# Patient Record
Sex: FEMALE | Race: ASIAN | NOT HISPANIC OR LATINO | Employment: FULL TIME | URBAN - METROPOLITAN AREA
[De-identification: names, ages, dates, MRNs, and addresses within clinical notes are randomized per-mention and may not be internally consistent; named-entity substitution may affect disease eponyms.]

---

## 2020-01-17 ENCOUNTER — TRANSFERRED RECORDS (OUTPATIENT)
Dept: HEALTH INFORMATION MANAGEMENT | Facility: CLINIC | Age: 25
End: 2020-01-17

## 2021-10-28 ENCOUNTER — TRANSFERRED RECORDS (OUTPATIENT)
Dept: HEALTH INFORMATION MANAGEMENT | Facility: CLINIC | Age: 26
End: 2021-10-28

## 2024-03-10 ENCOUNTER — HEALTH MAINTENANCE LETTER (OUTPATIENT)
Age: 29
End: 2024-03-10

## 2024-03-18 ENCOUNTER — OFFICE VISIT (OUTPATIENT)
Dept: OBGYN | Facility: CLINIC | Age: 29
End: 2024-03-18
Payer: COMMERCIAL

## 2024-03-18 VITALS
DIASTOLIC BLOOD PRESSURE: 70 MMHG | SYSTOLIC BLOOD PRESSURE: 118 MMHG | BODY MASS INDEX: 40.78 KG/M2 | WEIGHT: 216 LBS | HEIGHT: 61 IN

## 2024-03-18 DIAGNOSIS — N91.5 OLIGOMENORRHEA, UNSPECIFIED TYPE: ICD-10-CM

## 2024-03-18 DIAGNOSIS — N97.9 FEMALE INFERTILITY: ICD-10-CM

## 2024-03-18 DIAGNOSIS — Z12.4 SCREENING FOR CERVICAL CANCER: Primary | ICD-10-CM

## 2024-03-18 PROCEDURE — G0145 SCR C/V CYTO,THINLAYER,RESCR: HCPCS | Performed by: OBSTETRICS & GYNECOLOGY

## 2024-03-18 PROCEDURE — 99385 PREV VISIT NEW AGE 18-39: CPT | Performed by: OBSTETRICS & GYNECOLOGY

## 2024-03-18 PROCEDURE — 99213 OFFICE O/P EST LOW 20 MIN: CPT | Mod: 25 | Performed by: OBSTETRICS & GYNECOLOGY

## 2024-03-18 RX ORDER — MEDROXYPROGESTERONE ACETATE 10 MG
10 TABLET ORAL DAILY
Qty: 10 TABLET | Refills: 3 | Status: SHIPPED | OUTPATIENT
Start: 2024-03-18

## 2024-03-18 ASSESSMENT — PATIENT HEALTH QUESTIONNAIRE - PHQ9
SUM OF ALL RESPONSES TO PHQ QUESTIONS 1-9: 17
SUM OF ALL RESPONSES TO PHQ QUESTIONS 1-9: 17
10. IF YOU CHECKED OFF ANY PROBLEMS, HOW DIFFICULT HAVE THESE PROBLEMS MADE IT FOR YOU TO DO YOUR WORK, TAKE CARE OF THINGS AT HOME, OR GET ALONG WITH OTHER PEOPLE: SOMEWHAT DIFFICULT

## 2024-03-18 NOTE — PROGRESS NOTES
"  SUBJECTIVE:                                                   CC:  Patient presents with:  Gyn Exam: Irregular periods and trying to conceive      HPI:  Alfonso Riddle is a 29 year old  who presents to discuss infertility.    They have been trying to conceive for 2 years.  She only has very light bleeding 2 or 3 times per year, very irregular periods.  Previously she has had a workup for PCOS in the different system and was diagnosed with this.  She notes that she was given Provera at 1.4 withdrawal bleed, but after 10 days of the medication, she never achieved the withdrawal bleed.      She does have a mustache that she shaves on a daily basis.  She also has a diagnosis of prediabetes and takes metformin.  She is also been doing some reading about Cushing syndrome and wonders, since she does feel that she has a little hump on the back of her neck, if this diagnosis could apply.  Denies acanthosis, spontaneous nipple discharge.  Denies history of endometriosis, STI, PID, ectopic pregnancy, or other surgery on her fallopian tubes.    FOB is Antonio Lugo  1989.  He has not fathered any children.  He has never done a semen analysis.    Gyn History:  Patient's last menstrual period was 11/15/2023 (approximate).        Last 3 Pap and HPV Results:  done today    PMH, PSH, Soc Hx, Fam Hx, Meds, and allergies reviewed in Epic.        3/18/2024     9:01 AM   PHQ   PHQ-9 Total Score 17   Q9: Thoughts of better off dead/self-harm past 2 weeks Not at all        OBJECTIVE:     /70 (BP Location: Right arm, Patient Position: Chair, Cuff Size: Adult Large)   Ht 1.549 m (5' 1\")   Wt 98 kg (216 lb)   LMP 11/15/2023 (Approximate)   Breastfeeding No   BMI 40.81 kg/m      Gen: Healthy appearing obese female, no acute distress, comfortable  No obvious hirsutism or acne  Psych: mentation appears normal and affect bright  : Normal external female genitalia.  No external lesions.   SSE: Speculum exam reveals " vaginal epithelium well rugated with normal physiologic discharge. Cervix appears smooth, pink, with no visible lesions.     ASSESSMENT/PLAN:                                                      1. Screening for cervical cancer  - Pap imaged thin layer screen reflex to HPV if ASCUS - recommend age 25 - 29    2. Female infertility  - Mullerian Hormone Antibody; Future  - DHEA sulfate; Future  - Estradiol; Future  - Follicle stimulating hormone; Future  - Progesterone; Standing  - Prolactin; Future  - TSH with free T4 reflex; Future  - US Pelvic Transabdominal and Transvaginal; Future  - Testosterone Free and Total; Future  - Hemoglobin A1c; Future    3. Oligomenorrhea, unspecified type  Offered lab testing today, she would rather do the provera withdrawal bleed first and then do labs at a later date.  Also made recommendation for semen analysis for her partner.  Can make referral for endocrinology for Cushing work up if any labs found to be abnormal.   - Mullerian Hormone Antibody; Future  - DHEA sulfate; Future  - Estradiol; Future  - Follicle stimulating hormone; Future  - Progesterone; Standing  - Prolactin; Future  - TSH with free T4 reflex; Future  - US Pelvic Transabdominal and Transvaginal; Future  - Testosterone Free and Total; Future  - Hemoglobin A1c; Future  - medroxyPROGESTERone (PROVERA) 10 MG tablet; Take 1 tablet (10 mg) by mouth daily To bring on a period  Dispense: 10 tablet; Refill: 3    Greer River MD, MPH  Obstetrics and Gynecology

## 2024-03-18 NOTE — PROGRESS NOTES
"Alfonso is a 29 year old  female who presents for annual exam.     Menses are irregular and irregular lasting 3 days.  Menses flow: light.  Patient's last menstrual period was 11/15/2023 (approximate).. Using none for contraception.  She is currently considering pregnancy, trying for 2 years  Besides routine health maintenance,  she would like to discuss Infertility  GYNECOLOGIC HISTORY:    Alfonso is sexually active with 1 male partner(s) and is currently in monogamous relationship with .    History sexually transmitted infections:No STD history  STI testing offered?  Declined  History of abnormal Pap smear: NO - age 30- 65 PAP every 3 years recommended  Family history of breast CA: No  Family history of uterine/ovarian CA: No      HEALTH MAINTENANCE:  .  TSH: (No results found for: \"TSH\" )  Pap;Has not had one in years will do today.(Moved from [place])  PHQ2: 0      3/18/2024     9:01 AM   PHQ-2 (  [employer])   Q1: Little interest or pleasure in doing things 2   Q2: Feeling down, depressed or hopeless 3   PHQ-2 Score 5   Q1: Little interest or pleasure in doing things More than half the days   Q2: Feeling down, depressed or hopeless Nearly every day   PHQ-2 Score 5         HISTORY:  OB History    Para Term  AB Living   0 0 0 0 0 0   SAB IAB Ectopic Multiple Live Births   0 0 0 0 0     Past Medical History:   Diagnosis Date    PCOS (polycystic ovarian syndrome)      Past Surgical History:   Procedure Laterality Date    wissemyour       Family History   Problem Relation Age of Onset    Diabetes Maternal Grandmother     Diabetes Maternal Grandfather     Diabetes Paternal Aunt      Social History     Socioeconomic History    Marital status:      Spouse name: None    Number of children: None    Years of education: None    Highest education level: None   Tobacco Use    Smoking status: Never    Smokeless tobacco: Never   Substance and Sexual Activity    Alcohol use: Yes     Comment: social    " "Drug use: Never    Sexual activity: Yes     Partners: Male     No current outpatient medications on file.   No Known Allergies    Past medical, surgical, social and family history were reviewed and updated in EPIC.    EXAM:  /70 (BP Location: Right arm, Patient Position: Chair, Cuff Size: Adult Large)   Ht 1.549 m (5' 1\")   Wt 98 kg (216 lb)   LMP 11/15/2023 (Approximate)   Breastfeeding No   BMI 40.81 kg/m     BMI: Body mass index is 40.81 kg/m .  Constitutional: healthy, alert and no distress  Gastrointestinal: Abdomen soft, non-tender, non-distended. No masses, organomegaly.  :  Vulva:  No external lesions, normal female hair distribution, no inguinal adenopathy.    Urethra:  Midline, non-tender, well supported, no discharge  Vagina:  Moist, pink, no abnormal discharge, no lesions  Uterus:  Normal size , non-tender, freely mobile  Ovaries:  No masses appreciated, non-tender, mobile  Rectal Exam: deferred  Musculoskeletal: extremities normal  Skin: no suspicious lesions or rashes  Psychiatric: Affect appropriate, cooperative,mentation appears normal.     COUNSELING:   Reviewed preventive health counseling, as reflected in patient instructions  Special attention given to:        Family planning   reports that she has never smoked. She has never used smokeless tobacco.    Body mass index is 40.81 kg/m .  Weight management plan: Patient was referred to their PCP to discuss a diet and exercise plan.  FRAX Risk Assessment    ASSESSMENT:  29 year old female with satisfactory annual exam    1. Screening for cervical cancer  - Pap imaged thin layer screen reflex to HPV if ASCUS - recommend age 25 - 29  - HPV Hold (Lab Only)    2. Female infertility  - Mullerian Hormone Antibody; Future  - DHEA sulfate; Future  - Estradiol; Future  - Follicle stimulating hormone; Future  - Progesterone; Standing  - Prolactin; Future  - TSH with free T4 reflex; Future  - US Pelvic Transabdominal and Transvaginal; Future  - " Testosterone Free and Total; Future  - Hemoglobin A1c; Future  - Adult Mental Health  Referral; Future    3. Oligomenorrhea, unspecified type  - Mullerian Hormone Antibody; Future  - DHEA sulfate; Future  - Estradiol; Future  - Follicle stimulating hormone; Future  - Progesterone; Standing  - Prolactin; Future  - TSH with free T4 reflex; Future  - US Pelvic Transabdominal and Transvaginal; Future  - Testosterone Free and Total; Future  - Hemoglobin A1c; Future  - medroxyPROGESTERone (PROVERA) 10 MG tablet; Take 1 tablet (10 mg) by mouth daily To bring on a period  Dispense: 10 tablet; Refill: 3     Greer River MD

## 2024-03-18 NOTE — NURSING NOTE
"Chief Complaint   Patient presents with    Gyn Exam     Irregular periods and trying to conceive       Initial /70 (BP Location: Right arm, Patient Position: Chair, Cuff Size: Adult Large)   Ht 1.549 m (5' 1\")   Wt 98 kg (216 lb)   LMP 11/15/2023 (Approximate)   Breastfeeding No   BMI 40.81 kg/m   Estimated body mass index is 40.81 kg/m  as calculated from the following:    Height as of this encounter: 1.549 m (5' 1\").    Weight as of this encounter: 98 kg (216 lb).  BP completed using cuff size: large    Questioned patient about current smoking habits.  Pt. has never smoked.          The following HM Due: pap smear    Sharri Jc CMA      "

## 2024-03-21 LAB
BKR LAB AP GYN ADEQUACY: NORMAL
BKR LAB AP GYN INTERPRETATION: NORMAL
BKR LAB AP HPV REFLEX: NORMAL
BKR LAB AP LMP: NORMAL
BKR LAB AP PREVIOUS ABNORMAL: NORMAL
PATH REPORT.COMMENTS IMP SPEC: NORMAL
PATH REPORT.COMMENTS IMP SPEC: NORMAL
PATH REPORT.RELEVANT HX SPEC: NORMAL

## 2024-03-28 ENCOUNTER — TELEPHONE (OUTPATIENT)
Dept: OBGYN | Facility: CLINIC | Age: 29
End: 2024-03-28
Payer: COMMERCIAL

## 2024-03-28 NOTE — TELEPHONE ENCOUNTER
[facility] Call [facility]    Phone Message    May a detailed message be left on voicemail: yes     Reason for Call: Other: Pt states her new ins isnt active till next week so she is needing to reschedule her US and upcoming appt with Aleta. Pt is scheduled for a telephone call, Aleta next available isnt till May. Pt requesting an appt before then if possible. Please advise      Action Taken: Message routed to:  Other: [facility]    Travel Screening: Not Applicable

## 2024-03-28 NOTE — TELEPHONE ENCOUNTER
Returned call to patient, scheduled at [facility], evening of 4/1/24.    Florina PALOMARES RN

## 2024-04-01 ENCOUNTER — HOSPITAL ENCOUNTER (OUTPATIENT)
Dept: ULTRASOUND IMAGING | Facility: CLINIC | Age: 29
Discharge: HOME | End: 2024-04-01
Attending: OBSTETRICS & GYNECOLOGY | Admitting: OBSTETRICS & GYNECOLOGY
Payer: COMMERCIAL

## 2024-04-01 DIAGNOSIS — N97.9 FEMALE INFERTILITY: ICD-10-CM

## 2024-04-01 DIAGNOSIS — N91.5 OLIGOMENORRHEA, UNSPECIFIED TYPE: ICD-10-CM

## 2024-04-01 PROCEDURE — 76830 TRANSVAGINAL US NON-OB: CPT

## 2024-04-02 ENCOUNTER — VIRTUAL VISIT (OUTPATIENT)
Dept: OBGYN | Facility: CLINIC | Age: 29
End: 2024-04-02
Payer: COMMERCIAL

## 2024-04-02 DIAGNOSIS — N97.9 FEMALE INFERTILITY: Primary | ICD-10-CM

## 2024-04-02 DIAGNOSIS — N91.5 OLIGOMENORRHEA, UNSPECIFIED TYPE: ICD-10-CM

## 2024-04-02 PROCEDURE — 99213 OFFICE O/P EST LOW 20 MIN: CPT | Mod: 93 | Performed by: OBSTETRICS & GYNECOLOGY

## 2024-04-02 NOTE — PROGRESS NOTES
Alfonso Riddle is a 29 year old female who is being evaluated via a billable telephone visit.      What phone number would you like to be contacted at? [phone]  How would you like to obtain your AVS? Vladkristin      Alfonso Riddle is a 29 year old female who presents for virtual visit today for the following health issue(s):  Patient presents with:  Follow Up: On pelvic ultrasound that patient had yesterday.       Patient will be at her home in [facility] for the telephone visit.   Provider will be in clinic at [facility].      Additional information:     Got her period yesterday.  Pelvic US yesterday showed a thin EMS without specific signs of PCOS.  She has needle phobia and wonders if she needs to get the labs now (already ordered from prior visit).      EXAM: US PELVIC TRANSABDOMINAL AND TRANSVAGINAL  LOCATION: [facility]  DATE: 4/1/2024     INDICATION: Infertility. Oligomenorrhea.  COMPARISON: None.  TECHNIQUE: Transabdominal scans were performed. Endovaginal ultrasound was performed to better visualize the adnexa.     FINDINGS:     UTERUS: 6 x 3.2 x 2.8 cm. Normal in size and position with no masses.     ENDOMETRIUM: 2 mm. Normal smooth endometrium.     RIGHT OVARY: 2 x 1.8 x 1.1 cm. Normal. A few small peripheral follicles are present in the right ovary.     LEFT OVARY: 2.1 x 1.8 x 1.5 cm. Normal. Several small peripheral follicles are noted in the left ovary.     No significant free fluid.                                                                      IMPRESSION:  Unremarkable pelvic ultrasound.    Reviewed images personally, there are visible follicles on the periphery however the ovaries are not increased in volume nor are the ovaries specifically concerning morphologically for PCOS.  Also the endometrial stripe is thin.    Recommend going forth with the labs now (day 3, since she is now having a period), and then meeting in 4-6 wks after labs and SA have been  performed.  She may possibly need an appt w endocrinology or RAHEL for infertility.      All questions answered today.    1. Female infertility    2. Oligomenorrhea, unspecified type    Greer River MD, MPH  [facility]

## 2024-04-02 NOTE — RESULT ENCOUNTER NOTE
Results discussed directly with patient while patient was present during in person visit. Any further details documented in the note.   Greer River MD

## 2024-04-03 ENCOUNTER — LAB (OUTPATIENT)
Dept: LAB | Facility: CLINIC | Age: 29
End: 2024-04-03
Payer: COMMERCIAL

## 2024-04-03 DIAGNOSIS — N97.9 FEMALE INFERTILITY: ICD-10-CM

## 2024-04-03 DIAGNOSIS — N91.5 OLIGOMENORRHEA, UNSPECIFIED TYPE: ICD-10-CM

## 2024-04-03 LAB
ESTRADIOL SERPL-MCNC: 21 PG/ML
FSH SERPL IRP2-ACNC: 11.5 MIU/ML
HBA1C MFR BLD: 5.9 % (ref 0–5.6)
MIS SERPL-MCNC: 1.72 NG/ML (ref 0.89–9.9)
PROGEST SERPL-MCNC: 0.3 NG/ML
PROLACTIN SERPL 3RD IS-MCNC: 23 NG/ML (ref 5–23)
SHBG SERPL-SCNC: 17 NMOL/L (ref 30–135)
TSH SERPL DL<=0.005 MIU/L-ACNC: 3.64 UIU/ML (ref 0.3–4.2)

## 2024-04-03 PROCEDURE — 84146 ASSAY OF PROLACTIN: CPT

## 2024-04-03 PROCEDURE — 84403 ASSAY OF TOTAL TESTOSTERONE: CPT

## 2024-04-03 PROCEDURE — 83036 HEMOGLOBIN GLYCOSYLATED A1C: CPT

## 2024-04-03 PROCEDURE — 84144 ASSAY OF PROGESTERONE: CPT

## 2024-04-03 PROCEDURE — 36415 COLL VENOUS BLD VENIPUNCTURE: CPT

## 2024-04-03 PROCEDURE — 83001 ASSAY OF GONADOTROPIN (FSH): CPT

## 2024-04-03 PROCEDURE — 82670 ASSAY OF TOTAL ESTRADIOL: CPT

## 2024-04-03 PROCEDURE — 82627 DEHYDROEPIANDROSTERONE: CPT

## 2024-04-03 PROCEDURE — 82166 ASSAY ANTI-MULLERIAN HORM: CPT

## 2024-04-03 PROCEDURE — 84270 ASSAY OF SEX HORMONE GLOBUL: CPT

## 2024-04-03 PROCEDURE — 84443 ASSAY THYROID STIM HORMONE: CPT

## 2024-04-04 LAB — DHEA-S SERPL-MCNC: 225 UG/DL (ref 35–430)

## 2024-04-05 ENCOUNTER — VIRTUAL VISIT (OUTPATIENT)
Dept: OBGYN | Facility: CLINIC | Age: 29
End: 2024-04-05
Payer: COMMERCIAL

## 2024-04-05 DIAGNOSIS — R73.03 PRE-DIABETES: ICD-10-CM

## 2024-04-05 DIAGNOSIS — N97.9 FEMALE INFERTILITY: Primary | ICD-10-CM

## 2024-04-05 LAB
TESTOST FREE SERPL-MCNC: 0.62 NG/DL
TESTOST SERPL-MCNC: 25 NG/DL (ref 8–60)

## 2024-04-05 PROCEDURE — 99213 OFFICE O/P EST LOW 20 MIN: CPT | Mod: 93 | Performed by: OBSTETRICS & GYNECOLOGY

## 2024-04-05 NOTE — PROGRESS NOTES
Alfonso Riddle is a 29 year old female who is being evaluated via a billable telephone visit.    Provider: Eliezer  Patient: Home  What phone number would you like to be contacted at? [phone]  How would you like to obtain your AVS? Victorino      Alfonso Riddle is a 29 year old female who presents for virtual visit today for the following health issue(s):  Patient presents with:  Follow Up: Labs for fertility  Sharri Jc CMA      Additional information:     17 min telephone visit    Had a period spontaneously.  Did labs, checking in now to review lab findings.  Component      Latest Ref Rng 4/3/2024  8:01 AM   Free Testosterone Calculated      ng/dL 0.62    Testosterone Total      8 - 60 ng/dL 25    Anti-Mullerian Hormone      0.890 - 9.900 ng/mL 1.720    DHEA Sulfate      35 - 430 ug/dL 225    Estradiol      pg/mL 21    FSH      mIU/mL 11.5    Progesterone      ng/mL 0.3    Prolactin      5 - 23 ng/mL 23    TSH      0.30 - 4.20 uIU/mL 3.64    Hemoglobin A1C      0.0 - 5.6 % 5.9 (H)    Sex Hormone Binding Globulin      30 - 135 nmol/L 17 (L)       Legend:  (H) High  (L) Low    1. Female infertility  Reviewed normal lab and US findings.  On the spectrum of PCOS she is not quite meeting the diagnosis based on labs and imaging.  Would consider supplementing synthroid 25 mcg to get her TSH less than 2.5, but will defer this until after her weight mgmt consult.  Also recommend SA.    - XR Hysterosalpingogram; Future    2. Pre-diabetes  Recommend optimizing hgba1c prior to conception.  She is also requesting help with weight loss, concern about prediabetes, and wishing for endocrinology consult for evaluation of possible cushing or other reason it has been difficult to lose weight.  - Adult Comprehensive Weight Management  Referral; Future     Greer River MD, MPH  [facility]

## 2024-04-05 NOTE — PATIENT INSTRUCTIONS
-- on cycle day 6, 7, 8 or 9, you need to have a Hysterosalpingogram (HSG); this is a test done to check your uterus and tubes.  Call to schedule on the first day of your period.      -- your partner needs to obtain and submit a semen sample for Semen Analysis (best if 3 days of no ejaculation prior, and keep the specimen warm until it arrives at the specialized lab), we will be giving you a separate request for this test     -- you should be taking a vitamin with Folic Acid to prevent birth defects.

## 2024-08-08 NOTE — PROGRESS NOTES
"New Medical Weight Management Consult    PATIENT:  Alfonso Riddle   MRN:         [id number]   :         1995  NANDO:         2024      Dear Provider Not In System,    I had the pleasure of seeing your patient, Alfonso Riddle. Full intake/assessment was done to determine barriers to weight loss success and develop a treatment plan. Alfonso Riddle is a 29 year old female interested in treatment of medical problems associated with excess weight. She has a height of 5' 1\", a weight of 214 lbs 0 oz, and the calculated Body mass index is 40.43 kg/m .    Assessment & Plan   Problem List Items Addressed This Visit       Obesity, Class III, BMI 40-49.9 (morbid obesity) (H) - Primary     Initial visit. Added baseline labs. Recommend additional eval for disordered eating. Defer to PCP/endo for work-up for Cushings. Discussed medications options including GLP/GIP, metformin, or Contrave. Will read/review options and be in touch on MyChart pending labs/additional eating pattern eval.         Relevant Orders    Adult Sleep Eval & Management  Referral    Comprehensive metabolic panel [LAB17] (Future)    Vitamin D Deficiency [GBK747] (Future)    CBC with platelets    Pre-diabetes     Discussed in clinic visit. Anticipate improvement with weight loss.  Good candidate for metformin of GLP/GIP          Other Visit Diagnoses       Snoring        Relevant Orders    Adult Sleep Eval & Management  Referral             PROGRAM OVERVIEW  Reviewed options at [facility].   All questions were answered. Education provided on chronic disease management of obesity.    SURGICAL WEIGHT LOSS   Option presented given pt BMI and current comorbid conditions. No current interest.      MEDICATIONS:  We discussed healthy habits to assist with weight loss. We discussed the role of pharmacological agents in the treatment of obesity and the \"off-label\" use of medications in this practice. We reviewed medication that may " assist with weight loss. Indications, contraindications, risks/benefits, and potential side effects were discussed.   Discussed that medications must always be used together with lifestyle changes. Reviewed rationale for long term use of pharmacotherapy in chronic disease management for obesity.      Plan to connect on MyChart after labs for medication considerations    AOM Considerations:  Phentermine:  Palpitations:   No - but will get left upper chest pressure not constant and improves w/sitting. Discussed would defer to ED/UC for eval to r/o urgent/emergent issues.   Topiramate:  Current birth control:  None Hormone contraceptive consideration with Provera, if taking  GLP-1: Oral hormone contraceptive consideration with Provera, if taking. Discussed mechanism of action, common side effects, titration guidelines, and  discussed risks for pancreatitis/gallbladder problems/gastroparesis/low sugars/MTC/MEN2. Medication handout given in AV     Naltrexone:    Wellbutrin:    Metformin: potential - top choice w/possible PCOS/preDM - used previously and felt body became reliant on it, if forgot a dose would get migraines.  Contrave: potential - discussed narcotic consideration/SI monitoring  Qsymia: Current birth control:  none     Only listed medication Provera      Preconception counseling:  Discussed medications not recommended in pregnancy, trying for pregnancy, or breastfeeding.   Trying to get pregnant for a while - has been putting on a lot of weight since COVID. Did a lot of testing d/t not having regular testing. Concerned for cushing disease as reports has a 'hump'     Wanting to work on weight management prior really actively trying for pregnancy.     PATIENT INSTRUCTIONS:  Pt Instructions:  Talk to primary care about those concerns for Cushings/endocrine referral. Let me know if you'd like me to try putting a referral through on my end.   Recommend evaluation at an eating disorder clinic for evaluation of  possible disordered eating. If cleared - send us a [facility] message so we can get the Release Of Information to you so we can get that eval on the chart. This evaluation will be needed in order to know if we can have you schedule with our dietitians. If your evaluation is positive - let them know that you're working with me on medications  (you may be able to continue these or they may ask you to pause on them for a period - let me know on [facility] please). If they recommend working with their team of therapists/dietitians, we also would defer to their expertise.  Labs ordered.  Please call [phone] to set up a lab appt. Labs needed prior to starting medications  Read about medication options/considerations below and let's connect on [facility] after labs/evals for a med plan.  Recommend eval for those chest symptoms you discussed in clinic.       Goals:  I recommend eating breakfast within an hour of waking up and eating every 4-6 hours during the day and try minimize snacking between meals. Prioritizing veggies and proteins for food choices is also recommended as medically appropriate.  Recommend 64oz (2L) water daily as medically appropriate (6-8 glasses)  Recommend pursing regular exercise. 5 minutes of exercise every other day or every 2 days is a great place to start with aiming for 30 minutes 5 times a week as an end goal.  If you can, try to get some strength training twice weekly while losing weight to help preserve your muscle!      Follow up:    Call [phone] to schedule next visit in next available. Be in touch on [facility] for refills/concerns between visits    69 minutes spent on the date of the encounter doing chart review, history and exam, review test results, counseling, developing plan of care, documentation, and further activities as noted above.      She has the following co-morbidities:        8/14/2024     3:55 PM   --   I have the following health issues associated with obesity Pre-Diabetes  "   Polycystic Ovarian Syndrome    Infertility   I have the following symptoms associated with obesity Knee Pain    Infertility (Difficulty Getting Pregnant)    Back Pain    Fatigue    Irregular Menstral Cycle           8/14/2024     3:55 PM   Referring Provider   Please name the provider who referred you to Medical Weight Management  If you do not know, please answer \"I Don't Know\" Greer Rosado           8/14/2024     3:55 PM   Weight History   How concerned are you about your weight? Very Concerned   I became overweight As an Adult   The following factors have contributed to my weight gain Eating Wrong Types of Food    Lack of Exercise    Stress   I have tried the following methods to lose weight Exercise    Atkins-type Diet (Low Carb/High Protein)    Fasting   My lowest weight since age 18 was 168   My highest weight since age 18 was 220   The most weight I have ever lost was (lbs) 20   I have the following family history of obesity/being overweight One or more of my siblings are overweight   How has your weight changed over the last year? Gained           8/14/2024     3:55 PM   Diet Recall Review with Patient   If you do eat lunch, what types of food do you typically eat? Sandwhiches or salads, sometimes noodles and soup   If you do eat supper, what types of food do you typically eat? typically parboiled rice with meat and some veggies   If you do snack, what types of food do you typically eat? I love fruit so I snack on fruit or chips   How many glasses of juice do you drink in a typical day? 0   How many of glasses of milk do you drink in a typical day? 0   How many 8oz glasses of sugar containing drinks such as Yordy-Aid/sweet tea do you drink in a day? 0   How many cans/bottles of sugar pop/soda/tea/sports drinks do you drink in a day? 0   How many cans/bottles of diet pop/soda/tea or sports drink do you drink in a day? 0   How often do you have a drink of alcohol? Monthly or Less   If you do drink, how many " drinks might you have in a day? 5-6   Meals: often skips breakfast. Feels if eats breakfast will have greater hunger during the day.    Water: some days do well some two 16oz waterbottles. Does a 64 oz when at the office.     Discuss number of drinks.         8/14/2024     3:55 PM   Eating Habits   Generally, my meals include foods like these bread, pasta, rice, potatoes, corn, crackers, sweet dessert, pop, or juice Almost Everyday   Generally, my meals include foods like these fried meats, brats, burgers, french fries, pizza, cheese, chips, or ice cream A Few Times a Week   Eat fast food (like McDonalds, Burger Abilio, Taco Bell) Less Than Weekly   Eat at a buffet or sit-down restaurant Less Than Weekly   Eat most of my meals in front of the TV or computer A Few Times a Week   Often skip meals, eat at random times, have no regular eating times A Few Times a Week   Rarely sit down for a meal but snack or graze throughout Less Than Weekly   Eat extra snacks between meals Less Than Weekly   Eat most of my food at the end of the day Less Than Weekly   Eat in the middle of the night or wake up at night to eat Never   Eat extra snacks to prevent or correct low blood sugar Never   Eat to prevent acid reflux or stomach pain Never   Worry about not having enough food to eat Never   I eat when I am depressed Less Than Weekly   I eat when I am stressed Once a Week   I eat when I am bored Once a Week   I eat when I am anxious Never   I eat when I am happy or as a reward A Few Times a Week   I feel hungry all the time even if I just have eaten A Few Times a Week   Feeling full is important to me A Few Times a Week   I finish all the food on my plate even if I am already full Less Than Weekly   I can't resist eating delicious food or walk past the good food/smell Once a Week   I eat/snack without noticing that I am eating Never   I eat when I am preparing the meal Never   I eat more than usual when I see others eating Less Than  Weekly   I have trouble not eating sweets, ice cream, cookies, or chips if they are around the house Less Than Weekly   I think about food all day Never   What foods, if any, do you crave? Chips/Crackers   Please list any other foods you crave? I crave for noodles like torrey or vermicelli noodles           8/14/2024     3:55 PM   Amount of Food   I feel out of control when eating Monthly   I eat a large amount of food, like a loaf of bread, a box of cookies, a pint/quart of ice cream, all at once Never   I eat a large amount of food even when I am not hungry Monthly   I eat rapidly Monthly   I eat alone because I feel embarrassed and do not want others to see how much I have eaten Weekly   I eat until I am uncomfortably full Weekly   I feel bad, disgusted, or guilty after I overeat Everyday     I make myself vomit what I have eaten or use laxatives to get rid of food. Monthly   For her - has a hard time w/pooping/constipation so will drink a fiber tea once a month. Or may have a tendencies to take the tea after eating fried chicken/etc.    Reports eating alone over lunch period due feeling still hungry after having lunch from the office so will run out and get an extra serving. So will eat in the car.     Binge Eating Screening:  Objective binges at least 1x per week for the past 3 months.  Patient senses lack of control over eating during the binges.  Binge eating causes stress.  Binge eating episodes are associated with 3 or more of the following:    Eating until uncomfortably full. Yes - maybe once a week if going to out dinner at a restaurant or having torrey  Eating large amounts when not physically hungry.  no  Eating much more rapidly than usual. no  Eating alone due to being embarassed by the amount eaten. Borderline - see above   Feeling disgusted, depressed, or guilty after overeating. Yes - if having torrey or greasy foods (also some GI issues)   If patient answers yes to 3 of the above questions and answered yes  to frequency, distress of eating behavior and avoids using compensatory behaviors, refer for binge eating assessment.            8/14/2024     3:55 PM   Activity/Exercise History   How much of a typical 12 hour day do you spend sitting? Most of the Day   How much of a typical 12 hour day do you spend lying down? Less Than Half the Day   How much of a typical day do you spend walking/standing? Less Than Half the Day   How many hours (not including work) do you spend on the TV/Video Games/Computer/Tablet/Phone? 4-5 Hours   How many times a week are you active for the purpose of exercise? Once a Week   What keeps you from being more active? Too tired   How many total minutes do you spend doing some activity for the purpose of exercising when you exercise? Less Than 15 Minutes   Exercise is dance playlist. Doesn't really have strength training she does -  does know strength training. Or would walk treadmill/stairs if at the gym.     PAST MEDICAL HISTORY:  Past Medical History:   Diagnosis Date    Diabetes (H)     Pre-Diabetes    PCOS (polycystic ovarian syndrome)            8/14/2024     3:55 PM   Work/Social History Reviewed With Patient   My employment status is Full-Time   My job is    How much of your job is spent on the computer or phone? 100%   How many hours do you spend commuting to work daily? 1.5-2 hours   What is your marital status? /In a Relationship   If in a relationship, is your significant other overweight? No   Who do you live with? My  and dog   Who does the food shopping? We both do       Social History     Tobacco Use    Smoking status: Never    Smokeless tobacco: Never   Substance Use Topics    Alcohol use: Yes     Comment: social    Drug use: Never            8/14/2024     3:55 PM   Mental Health History Reviewed With Patient   Have you ever been physically or sexually abused? No   How often in the past 2 weeks have you felt little interest or pleasure  in doing things? Nearly Everyday   Over the past 2 weeks how often have you felt down, depressed, or hopeless? For Several Days     Working with the therapist - not interested at this time         8/14/2024     3:55 PM   Questions Reviewed With Patient   How ready are you to make changes regarding your weight? Number 1 = Not ready at all to make changes up to 10 = very ready. 10   How confident are you that you can change? 1 = Not confident that you will be successful making changes up to 10 = very confident. 10           8/14/2024     3:55 PM   Sleep History Reviewed With Patient   How many hours do you sleep at night? 6     Do you SNORE loudly (louder than talking or loud enough to be heard through closed doors)? Yes   Do you often feel TIRED, fatigued, or sleepy during daytime? Yes   Has anyone OBSERVED you stop breathing during your sleep? No   Do you have or are you being treated for high blood PRESSURE? No   BMI more than 35kg/m2? Yes   AGE over 50 years old? No   NECK circumference > 16 inches (40cm)? Yes   GENDER: Male? No   STOP-BANG Total Score (range: 0 - 8) 5   Score 5, endorses loud snoring, recommend checking for sleep apnea. Reports coworker didn't notice snoring but hubs mentioned.    MEDICATIONS:   Current Outpatient Medications   Medication Sig Dispense Refill    medroxyPROGESTERone (PROVERA) 10 MG tablet Take 1 tablet (10 mg) by mouth daily To bring on a period (Patient not taking: Reported on 4/2/2024) 10 tablet 3       ALLERGIES:   No Known Allergies    ROS:    HEENT  H/O glaucoma:  no  Cardiovascular  CAD:   no  Palpitations:   No - but will get left upper chest pressure not constant and improves w/sitting. Discussed would defer to ED/UC for eval to r/o urgent/emergent issues.   HTN:    no  Gastrointestinal  GERD:   Yes - some twice a month  Constipation:   Monthly - improves w/fiber tea  Gastroparesis:  no  Liver Dz:   no  H/O Pancreatitis:  no  H/O Gallbladder Dz: no  Psychiatric  Anxiety:  "  no  Depression:  no  Bipolar:  no  H/O ETOH/Drug abuse: no  H/O eating disorder: no  Endocrine  PMH/FMH of MTC or MEN2:  no  Neurologic:  H/O seizures:   no  Headaches:  Some, maybe monthly   Memory Impairment:  no    H/O kidney stones:  no  Kidney disease:  no  Current birth control:  none    LABS/RECORDS REVIEWED:  Labs [facility] in [facility]    Labs:  4/3/24 hemoglobin A1c 5.9  TSH normal    BP Readings from Last 6 Encounters:   08/16/24 122/86   03/18/24 118/70       Pulse Readings from Last 6 Encounters:   08/16/24 79       PHYSICAL EXAM:  /86   Pulse 79   Ht 5' 1\" (1.549 m)   Wt 214 lb (97.1 kg)   SpO2 97%   BMI 40.43 kg/m    GENERAL: Healthy, alert and no distress  EYES: Eyes grossly normal to inspection.    RESP: No audible wheeze, cough, or visible cyanosis.  No increased work of breathing. Lungs clear to auscultation  Heart: regular rate and rhythm. No significant murmurs, rubs, or gallops  SKIN: Visible skin clear.   NEURO: Mentation and speech appropriate for age.  PSYCH: Mentation appears normal, affect normal/bright, judgement and insight intact, normal speech and appearance well-groomed.        Sincerely,      Lindsey Alcantar PA-C           "

## 2024-08-14 ASSESSMENT — SLEEP AND FATIGUE QUESTIONNAIRES
HOW LIKELY ARE YOU TO NOD OFF OR FALL ASLEEP IN A CAR, WHILE STOPPED FOR A FEW MINUTES IN TRAFFIC: WOULD NEVER DOZE
HOW LIKELY ARE YOU TO NOD OFF OR FALL ASLEEP WHILE WATCHING TV: MODERATE CHANCE OF DOZING
HOW LIKELY ARE YOU TO NOD OFF OR FALL ASLEEP WHILE SITTING INACTIVE IN A PUBLIC PLACE: SLIGHT CHANCE OF DOZING
HOW LIKELY ARE YOU TO NOD OFF OR FALL ASLEEP WHILE LYING DOWN TO REST IN THE AFTERNOON WHEN CIRCUMSTANCES PERMIT: HIGH CHANCE OF DOZING
HOW LIKELY ARE YOU TO NOD OFF OR FALL ASLEEP WHEN YOU ARE A PASSENGER IN A CAR FOR AN HOUR WITHOUT A BREAK: MODERATE CHANCE OF DOZING
HOW LIKELY ARE YOU TO NOD OFF OR FALL ASLEEP WHILE SITTING AND READING: WOULD NEVER DOZE
HOW LIKELY ARE YOU TO NOD OFF OR FALL ASLEEP WHILE SITTING AND TALKING TO SOMEONE: WOULD NEVER DOZE
HOW LIKELY ARE YOU TO NOD OFF OR FALL ASLEEP WHILE SITTING QUIETLY AFTER LUNCH WITHOUT ALCOHOL: WOULD NEVER DOZE

## 2024-08-16 ENCOUNTER — OFFICE VISIT (OUTPATIENT)
Dept: SURGERY | Facility: CLINIC | Age: 29
End: 2024-08-16
Payer: COMMERCIAL

## 2024-08-16 ENCOUNTER — ALLIED HEALTH/NURSE VISIT (OUTPATIENT)
Dept: SURGERY | Facility: CLINIC | Age: 29
End: 2024-08-16
Payer: COMMERCIAL

## 2024-08-16 VITALS
HEART RATE: 79 BPM | BODY MASS INDEX: 40.4 KG/M2 | OXYGEN SATURATION: 97 % | DIASTOLIC BLOOD PRESSURE: 86 MMHG | SYSTOLIC BLOOD PRESSURE: 122 MMHG | WEIGHT: 214 LBS | HEIGHT: 61 IN

## 2024-08-16 DIAGNOSIS — E66.01 OBESITY, CLASS III, BMI 40-49.9 (MORBID OBESITY) (H): Primary | ICD-10-CM

## 2024-08-16 DIAGNOSIS — R73.03 PRE-DIABETES: ICD-10-CM

## 2024-08-16 DIAGNOSIS — R06.83 SNORING: ICD-10-CM

## 2024-08-16 PROBLEM — E66.813 OBESITY, CLASS III, BMI 40-49.9 (MORBID OBESITY) (H): Status: ACTIVE | Noted: 2024-08-16

## 2024-08-16 PROCEDURE — 99205 OFFICE O/P NEW HI 60 MIN: CPT | Performed by: PHYSICIAN ASSISTANT

## 2024-08-16 NOTE — PATIENT INSTRUCTIONS
Nice to talk with you today. Below is the plan discussed.-  Lindsey Alcantar PA-C     Pt Instructions:  Talk to primary care about those concerns for Cushings/endocrine referral. Let me know if you'd like me to try putting a referral through on my end.   Recommend evaluation at an eating disorder clinic for evaluation of possible disordered eating. If cleared - send us a Fisgo message so we can get the Release Of Information to you so we can get that eval on the chart. This evaluation will be needed in order to know if we can have you schedule with our dietitians. If your evaluation is positive - let them know that you're working with me on medications  (you may be able to continue these or they may ask you to pause on them for a period - let me know on K[facility] Inchart please). If they recommend working with their team of therapists/dietitians, we also would defer to their expertise.  Labs ordered.  Please call [phone] to set up a lab appt. Labs needed prior to starting medications  Read about medication options/considerations below and let's connect on Style for Hiret after labs/evals for a med plan.  Recommend eval for those chest symptoms you discussed in clinic.       Goals:  I recommend eating breakfast within an hour of waking up and eating every 4-6 hours during the day and try minimize snacking between meals. Prioritizing veggies and proteins for food choices is also recommended as medically appropriate.  Recommend 64oz (2L) water daily as medically appropriate (6-8 glasses)  Recommend pursing regular exercise. 5 minutes of exercise every other day or every 2 days is a great place to start with aiming for 30 minutes 5 times a week as an end goal.  If you can, try to get some strength training twice weekly while losing weight to help preserve your muscle!      Follow up:    Call [phone] to schedule next visit in next available. Be in touch on [email or web address] for refills/concerns between visits    Seated Exercises for Arms  and Legs  [email or web address]    Zepbound (Tirzepatide)    Zepbound (Tirzepatide): is an injectable prescription medicine recently FDA approved for adults with obesity or overweight with an additional condition affected by their weight.      It is given as a shot once a week. It activates the body's receptors for GIP (glucose-dependent insulinotropic polypeptide) and GLP-1 (glucagon-like peptide-1) receptor, two naturally occurring hormones that help tell the brain that you are full. It also works is by slowing down how quickly food leaves your stomach.     You will start to feel gabriel more quickly, notice portion changes and eat less often between meals.  Patients are advised to eat slowly and purposefully. Give yourself less portions. You may find after starting this medication you have a new point of fullness. Maintain consistent eating schedule with meals +/- snacks and get in good hydration.    Dosing:  Initial dose: 2.5 mg injected subcutaneously once weekly for 4 weeks  Further dose changes can include: increase to 5 mg once weekly for 4 weeks, then 7.5 mg once weekly for 4 weeks, then 10 mg once weekly for 4 weeks then 12.5 mg once weekly for 4 weeks, then 15 mg (maximum dose) once weekly.    Dose changes are based on how you are tolerating the current dose, what benefits you are seeing at the current dose and if improvement in effectiveness of the drug is needed. The goal is to find the dose that works best for you with the least amount of side effects. You may find you reach this at a lower dose than the maximum dose.     Side effects: Common side effects include nausea, Heartburn,diarrhea, constipation. This is worse when starting the medication and with dose dose escalation.  It does improve with time. Stay adequately hydrated and eat small portions to decrease the risk of side effects.     The risk of pancreatitis (inflammation of the pancreas) has been associated with this type of  medication, but is very rare.  If you have had pancreatitis in the past, this medication may not be for you. If you experience persistent severe abdominal pain (sometimes radiating to the back potentially accompanied by vomiting and have a fever), stop the medication and contact your provider immediately for assessment. They will do a blood test to check for pancreatitis.       Caution:   Tirzepatide (Zepbound, Mounjaro) May decrease effectiveness of your oral birth control while starting and with dose adjustments.  Use backup forms of birth control if necessary.      For any questions or concerns please send a Intraxio message to our team or call our weight management call center at [phone] during regular business hours.     There is a small chance you may have some low blood sugar after taking the medication.   The signs of low blood sugar are:  Weakness  Shaky   Hungry  Sweating  Confusion      See below for ways to treat low blood sugar without adding in lots of extra calories.      Treating Low Blood Sugar    If you have symptoms of low blood sugar (sweating, shaking, dizzy, confused) eat 15 grams of carbs and wait 15 minutes:    Glucose Tabs are best for sugars under 70 -  Dex4 or BD Glucose tablets are good, you will need to take 3-4 of these to equal 15 grams.     One small box of raisins  4 oz fruit juice box or   cup fruit juice  1 small apple  1 small banana    cup canned fruit in water    English muffin or a slice of bread with jelly   1 low fat frozen waffle with sugar-free syrup    cup cottage cheese with   cup frozen or fresh blueberries  1 cup skim or low-fat milk    cup whole grain cereal  4-6 crackers such as Triscuits      This medication is usually not covered by insurance and can be quite expensive. Sometimes a prior authorization is required, which may take up to 1-2 weeks for an insurance company to make a decision if they will cover the medication. Please be patient, you will be notified  after a decision has been made.    Contact the nurse via [email or web address] or call [phone] if you have any questions or concerns. (Do not stop taking it if you don't think it's working. For some people it works without them knowing it.)     In order to get refills of this or any medication we prescribe you must be seen in the medical weight mgmt clinic every 2-4 months.    Using Your Mounjaro/Zepbound Pen  Medicine (tirzepatide)    Storing your pens  Store your pens in the refrigerator until you are ready to use them. Don't let them freeze.  Your pen may be stored at room temperature for 21 days or fewer. Just make sure the temperature doesn't get higher than 86 or lower than 46 degrees Fahrenheit.   Protect the pens from light.  Never use any pens that have .    Check your pen before use:  The liquid in the pen window should be clear or light yellow. Bubbles are okay to see.   Do not use the pen if you can see the window contains any specks, is cloudy, or has changed color.  Make sure you have the medication and dose your health care provider prescribed.    Getting ready to inject:   1. Wash and dry your hands well.  2. Make sure the counter you use to place your supplies on is clean.  3. Make sure your injection time will not be interrupted by children or pets.  4. Have alcohol wipes or alcohol and cotton balls available to clean the injection site.   5. Choose your injection site. It can be on your stomach, back of upper arms, or upper legs. Remember to change your injection site each time you inject. Try to be at least 1 inch away from the previous one. Stay at least 1 inch away from your belly button.       Inject your dose:  1. Each pen is prefilled with one dose of medicine.    2. Pull off the grey cap at the bottom of the pen. Throw it in the trash. Do not put the cap back on the pen.   3. Make sure your pen is in the locked position. You will find the lock at the top of the pen.   4. Clean the injection  site with alcohol.   5. Place the clear part of the pen against your skin. Unlock the pen by turning it to the unlock  position at the top.   6. Press and hold the purple injection button at the top of the pen. Listen for 2 clicks. The last click tells you the injection has been completed.   7. This injection is given 1 day a week. If you need to change the day you inject, there needs to be at least 3 days or 72 hours between the two doses.  8. If you miss a dose, you may take the dose within 4 days or 96 hours of the missed dose.   9. Your injection may be best tolerated if given at night.     Disposing of your pens:  Dispose of your pens in a puncture-resistant container (hard plastic bottle) or Sharps container.  Check with the county you live in on how to dispose of the container.  Do not recycle the container with used pens.       Of note, you may not be able to  your medication right away. It may require a prior authorization from your insurance company. This may take a week or more.       METFORMIN    Metformin is a medication that is used to assist with lowering blood sugars in patients that have Pre-Diabetes or Diabetes. It has also been found to help with modest weight loss.    Metformin helps to lower blood sugars by lowering the amount of sugar (glucose) your liver produces that would otherwise cause your blood sugar to increase. It also makes your body respond better to insulin (the product that lowers your blood sugar).     Emerging research reported in journals suggests metformin may also lead to weight loss as a result of changes in the appetite centers of the brain, shifts in the gut microbiome, and reversal of metabolic changes that usually happen with age.    Starting instructions:  Take 1 tablet by mouth daily with a meal for 1 week, then increase to 2 tablets daily with a meal, if tolerating can increase to 3 tablets daily with a meal or at bedtime.     Side-effects. Metformin is  generally well tolerated. The main side-effects we see are: Diarrhea, nausea and stomach upset - this tends to subside over time as your body gets used to the medication. Taking this medications with food will lower these side effects.    Low blood sugar (hypoglycemia) is rare to occur with metformin, but can occur if you do not eat enough or are taking other medications that assist to lower blood sugar. The signs of low blood sugar are:  Weakness  Shaky   Hungry  Sweating  Confusion     For any questions or concerns please send a [employer] message to our team or call our weight management call center at [phone] during regular business hours. For questions during evenings or weekends your messages will be addressed during the next business day.  For emergencies please call 911 or seek immediate medical care.      In order to get refills of this or any medication we prescribe you must be seen in the medical weight mgmt clinic every 2-3 months.         CONTRAVE (bupropion and naltrexone)    We are considering starting Contrave. Contrave is specifically prescribed for obesity. It is a combination of two medications, Naltrexone and Bupropione (Wellbutrin). The Bupropion helps lessen appetite and the Naltrexone works by blocking certain receptors in the brain and curbing cravings.      For some of our patients the medication works right away. Other patients don't feel much of a change but find they've lost weight. Like all weight loss medications, Contrave works best when you help it work. This means:  1. Having less tempting high calorie (fattening) food around the house or office. (For people with strong cravings this is very important.)   2. Staying away from situations or people that may trigger your cravings .   3. Eating out only one time or less each week.  4. Eating your meals at a table with the TV or computer off.    WARNING: This medication blocks the action of opioid type pain medications. If you routinely  take any medication like Codeine, Oxycontin, Percocet, Morphine, Dilaudid or Methodone, do not take this until you have talked with weight management staff.     FYI- If you are planning on having an elective surgery, you should start titrating yourself off Contrave 4 weeks prior to surgery. This would entail decreasing the same way you started the medication, by taking one tablet less per week for 4 weeks, until you are able to stop the medication. If you need to stop it quicker, you can take 1 tablet in the morning and 1 tablet in the evening for 2 weeks, and then stop the medication. Please don't hesitate to call if you have any questions regarding this.    Dosing as follows:  Week 1- 1 tablet in the morning  Week 2- 1 tablet in the morning and 1 tablet at bedtime  Week 3- 2 tablets in the morning and 1 tablet at bedtime  Week 4 and thereafter- 2 tablets in the morning and 2 tablets at bedtime    Side-effects: The most common side effect include: nausea; constipation; headache; vomiting; dizziness; diarrhea; trouble sleeping; and dry mouth.     This medication typically isn t covered by insurance and will require a prior authorization, which can take 1-2 weeks to review. Patients can visit [email or web address] and sign up for the Pharmacy savings program and receive the medication for $60-70 per month for 12 months if eligible.    For any questions or concerns please send a [email or web address] message to our team or call our weight management call center at [phone] during regular business hours. For questions during evenings or weekends your messages will be addressed during the next business day.  For emergencies please call 911 or seek immediate medical care.     (Do not stop taking it if you don't think it's working. For some people it works without them knowing it.)

## 2024-08-16 NOTE — ASSESSMENT & PLAN NOTE
Discussed in clinic visit. Anticipate improvement with weight loss.  Good candidate for metformin of GLP/GIP

## 2024-08-16 NOTE — ASSESSMENT & PLAN NOTE
Initial visit. Added baseline labs. Recommend additional eval for disordered eating. Defer to PCP/endo for work-up for Cushings. Discussed medications options including GLP/GIP, metformin, or Contrave. Will read/review options and be in touch on MyChart pending labs/additional eating pattern eval.

## 2024-08-19 ENCOUNTER — NURSE TRIAGE (OUTPATIENT)
Dept: PEDIATRICS | Facility: CLINIC | Age: 29
End: 2024-08-19
Payer: COMMERCIAL

## 2024-08-19 ENCOUNTER — OFFICE VISIT (OUTPATIENT)
Dept: URGENT CARE | Facility: URGENT CARE | Age: 29
End: 2024-08-19
Payer: COMMERCIAL

## 2024-08-19 VITALS
BODY MASS INDEX: 40.34 KG/M2 | SYSTOLIC BLOOD PRESSURE: 127 MMHG | DIASTOLIC BLOOD PRESSURE: 87 MMHG | HEART RATE: 77 BPM | RESPIRATION RATE: 16 BRPM | WEIGHT: 213.5 LBS | OXYGEN SATURATION: 100 % | TEMPERATURE: 98.1 F

## 2024-08-19 DIAGNOSIS — R07.89 CHEST PRESSURE: Primary | ICD-10-CM

## 2024-08-19 PROCEDURE — 93000 ELECTROCARDIOGRAM COMPLETE: CPT | Performed by: PHYSICIAN ASSISTANT

## 2024-08-19 PROCEDURE — 99203 OFFICE O/P NEW LOW 30 MIN: CPT | Performed by: PHYSICIAN ASSISTANT

## 2024-08-19 NOTE — TELEPHONE ENCOUNTER
"S: Chest pain  B: 2 episodes in past 3 months. Last episode beginning of June. \"Driving home and felt hot/sweaty\".  Most recent episode 8/17/24. Heart was \"pounding\". Chest felt \"pressure achy\". Left deltoid felt warm to the touch.    Has not been well hydrated. Urine was a dark yellow.   Has not been eating well.  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    A: HR 63-80  BP Readings from Last 1 Encounters:   08/16/24 122/86     Denies: shortness of breath, radiating,    R: no same day appointments available. Patient will follow up at Urgent Care today.      Reason for Disposition   Patient wants to be seen    Additional Information   Negative: Followed an injury to chest   Negative: SEVERE chest pain   Negative: Pain also in shoulder(s) or arm(s) or jaw   Negative: Difficulty breathing   Negative: Cocaine use within last 3 days   Negative: Major surgery in the past month   Negative: Hip or leg fracture (broken bone) in past month (or had cast on leg or ankle in past month)   Negative: Illness requiring prolonged bedrest in past month (e.g., immobilization, long hospital stay)   Negative: Long-distance travel in past month (e.g., car, bus, train, plane; with trip lasting 6 or more hours)   Negative: History of prior 'blood clot' in leg or lungs (i.e., deep vein thrombosis, pulmonary embolism)   Negative: History of inherited increased risk of blood clots (e.g., Factor 5 Leiden, Anti-thrombin 3, Protein C or Protein S deficiency, Prothrombin mutation)   Negative: Cancer treatment in the past two months (or has cancer now)   Negative: Heart beating irregularly or very rapidly   Negative: Chest pain lasting longer than 5 minutes and occurred in last 3 days (72 hours) (Exception: Feels exactly the same as previously diagnosed heartburn and has accompanying sour taste in mouth.)   Negative: Chest pain or 'angina' comes and goes and is happening more often (increasing in frequency) or getting " worse (increasing in severity) (Exception: Chest pains that last only a few seconds.)   Negative: Dizziness or lightheadedness   Negative: Coughing up blood   Negative: Patient sounds very sick or weak to the triager   Negative: Patient says chest pain feels exactly the same as previously diagnosed 'heartburn' and describes burning in chest and accompanying sour taste in mouth   Negative: All other patients with chest pain (Exception: Fleeting chest pain lasting a few seconds.)   Negative: Rash in same area as pain (may be described as 'small blisters')   Negative: Fever > 100.4 F (38.0 C)   Negative: Chest pain(s) lasting a few seconds persists > 3 days    Protocols used: Chest Pain-A-[place]

## 2024-08-20 NOTE — PROGRESS NOTES
URGENT CARE VISIT:    SUBJECTIVE:  Alfonso Riddle is a 29 year old female who presents with chest pressure for 1 week(s). Symptoms are mild and intermittent. She denies shortness of breath and cough. She has tried nothing with no relief of symptoms. Symptoms are exacerbated by being anxious. Symptoms were relieved by putting left arm backwards and worsened with normal posture. Recent sick contacts include none. She denies a history of asthma. Patient denies history of smoking. She had a recent food pop up event and was feeling quite stressed.     PMH:   Past Medical History:   Diagnosis Date    Diabetes (H)     Pre-Diabetes    PCOS (polycystic ovarian syndrome)      Allergies: Patient has no known allergies.  Medications:   Current Outpatient Medications   Medication Sig Dispense Refill    medroxyPROGESTERone (PROVERA) 10 MG tablet Take 1 tablet (10 mg) by mouth daily To bring on a period (Patient not taking: Reported on 4/2/2024) 10 tablet 3     Social History:   Social History     Tobacco Use    Smoking status: Never    Smokeless tobacco: Never   Substance Use Topics    Alcohol use: Yes     Comment: social       ROS: ROS otherwise found to be negative except as noted above.    OBJECTIVE:  /87   Pulse 77   Temp 98.1  F (36.7  C)   Resp 16   Wt 96.8 kg (213 lb 8 oz)   SpO2 100%   BMI 40.34 kg/m    General: WDWN in NAD.   Eyes: Non-injected conjunctivas without drainage bilaterally.  Cardiac: RRR without murmurs, rubs, or gallops.  Respiratory: LCTAB without adventitious sounds. Non-labored breathing.  MSK: no chest wall TTP. FROM back.   Lymph nodes: no cervical adenopathy.      ASSESSMENT:    ICD-10-CM    1. Chest pressure  R07.89 EKG 12-lead complete w/read - Clinics           PLAN:  Patient Instructions   Patient was educated on the natural course of condition which typically resolves within a few weeks. EKG showed normal sinus rhythm. I suspect MSK strain with secondary anxiety. Conservative measures  discussed including rest, heat, and over-the-counter analgesics (Tylenol or Ibuprofen).  Avoid heavy lifting. See your primary care provider if symptoms worsen or do not improve in 7 days. Seek emergency care if you develop severe chest pain, nausea, vomiting, shortness of breath, or extremity weakness.      Patient verbalized understanding and is agreeable to plan. The patient was discharged ambulatory and in stable condition.    Kristi Ozuna PA-C ....................  8/19/2024   7:31 PM

## 2024-08-20 NOTE — PATIENT INSTRUCTIONS
Patient was educated on the natural course of condition which typically resolves within a few weeks. EKG showed normal sinus rhythm. I suspect MSK strain with secondary anxiety. Conservative measures discussed including rest, heat, and over-the-counter analgesics (Tylenol or Ibuprofen).  Avoid heavy lifting. See your primary care provider if symptoms worsen or do not improve in 7 days. Seek emergency care if you develop severe chest pain, nausea, vomiting, shortness of breath, or extremity weakness.

## 2024-08-30 ENCOUNTER — LAB (OUTPATIENT)
Dept: LAB | Facility: CLINIC | Age: 29
End: 2024-08-30
Payer: COMMERCIAL

## 2024-08-30 DIAGNOSIS — E66.01 OBESITY, CLASS III, BMI 40-49.9 (MORBID OBESITY) (H): ICD-10-CM

## 2024-08-30 LAB
ALBUMIN SERPL BCG-MCNC: 4.4 G/DL (ref 3.5–5.2)
ALP SERPL-CCNC: 93 U/L (ref 40–150)
ALT SERPL W P-5'-P-CCNC: 29 U/L (ref 0–50)
ANION GAP SERPL CALCULATED.3IONS-SCNC: 11 MMOL/L (ref 7–15)
AST SERPL W P-5'-P-CCNC: 26 U/L (ref 0–45)
BILIRUB SERPL-MCNC: 0.3 MG/DL
BUN SERPL-MCNC: 10.1 MG/DL (ref 6–20)
CALCIUM SERPL-MCNC: 9.8 MG/DL (ref 8.8–10.4)
CHLORIDE SERPL-SCNC: 102 MMOL/L (ref 98–107)
CREAT SERPL-MCNC: 0.77 MG/DL (ref 0.51–0.95)
EGFRCR SERPLBLD CKD-EPI 2021: >90 ML/MIN/1.73M2
ERYTHROCYTE [DISTWIDTH] IN BLOOD BY AUTOMATED COUNT: 13.2 % (ref 10–15)
GLUCOSE SERPL-MCNC: 109 MG/DL (ref 70–99)
HCO3 SERPL-SCNC: 26 MMOL/L (ref 22–29)
HCT VFR BLD AUTO: 43.6 % (ref 35–47)
HGB BLD-MCNC: 14.7 G/DL (ref 11.7–15.7)
MCH RBC QN AUTO: 29.3 PG (ref 26.5–33)
MCHC RBC AUTO-ENTMCNC: 33.7 G/DL (ref 31.5–36.5)
MCV RBC AUTO: 87 FL (ref 78–100)
PLATELET # BLD AUTO: 289 10E3/UL (ref 150–450)
POTASSIUM SERPL-SCNC: 4.1 MMOL/L (ref 3.4–5.3)
PROT SERPL-MCNC: 7.6 G/DL (ref 6.4–8.3)
RBC # BLD AUTO: 5.02 10E6/UL (ref 3.8–5.2)
SODIUM SERPL-SCNC: 139 MMOL/L (ref 135–145)
VIT D+METAB SERPL-MCNC: 17 NG/ML (ref 20–50)
WBC # BLD AUTO: 12.4 10E3/UL (ref 4–11)

## 2024-08-30 PROCEDURE — 82306 VITAMIN D 25 HYDROXY: CPT

## 2024-08-30 PROCEDURE — 36415 COLL VENOUS BLD VENIPUNCTURE: CPT

## 2024-08-30 PROCEDURE — 85027 COMPLETE CBC AUTOMATED: CPT

## 2024-08-30 PROCEDURE — 80053 COMPREHEN METABOLIC PANEL: CPT

## 2024-09-05 DIAGNOSIS — R79.89 LOW VITAMIN D LEVEL: Primary | ICD-10-CM

## 2024-09-23 ENCOUNTER — OFFICE VISIT (OUTPATIENT)
Dept: FAMILY MEDICINE | Facility: CLINIC | Age: 29
End: 2024-09-23
Payer: COMMERCIAL

## 2024-09-23 VITALS
OXYGEN SATURATION: 99 % | SYSTOLIC BLOOD PRESSURE: 120 MMHG | BODY MASS INDEX: 40.78 KG/M2 | TEMPERATURE: 98 F | DIASTOLIC BLOOD PRESSURE: 84 MMHG | RESPIRATION RATE: 17 BRPM | WEIGHT: 216 LBS | HEIGHT: 61 IN | HEART RATE: 75 BPM

## 2024-09-23 DIAGNOSIS — N97.9 FEMALE INFERTILITY: ICD-10-CM

## 2024-09-23 DIAGNOSIS — Z76.89 ENCOUNTER TO ESTABLISH CARE: Primary | ICD-10-CM

## 2024-09-23 DIAGNOSIS — E65 DORSOCERVICAL FAT PAD: ICD-10-CM

## 2024-09-23 DIAGNOSIS — F41.1 GENERALIZED ANXIETY DISORDER: ICD-10-CM

## 2024-09-23 DIAGNOSIS — H65.92 OME (OTITIS MEDIA WITH EFFUSION), LEFT: ICD-10-CM

## 2024-09-23 DIAGNOSIS — E66.01 OBESITY, CLASS III, BMI 40-49.9 (MORBID OBESITY) (H): ICD-10-CM

## 2024-09-23 DIAGNOSIS — R73.03 PRE-DIABETES: ICD-10-CM

## 2024-09-23 PROCEDURE — 99215 OFFICE O/P EST HI 40 MIN: CPT | Performed by: PHYSICIAN ASSISTANT

## 2024-09-23 ASSESSMENT — ANXIETY QUESTIONNAIRES
5. BEING SO RESTLESS THAT IT IS HARD TO SIT STILL: MORE THAN HALF THE DAYS
GAD7 TOTAL SCORE: 7
2. NOT BEING ABLE TO STOP OR CONTROL WORRYING: SEVERAL DAYS
IF YOU CHECKED OFF ANY PROBLEMS ON THIS QUESTIONNAIRE, HOW DIFFICULT HAVE THESE PROBLEMS MADE IT FOR YOU TO DO YOUR WORK, TAKE CARE OF THINGS AT HOME, OR GET ALONG WITH OTHER PEOPLE: NOT DIFFICULT AT ALL
6. BECOMING EASILY ANNOYED OR IRRITABLE: SEVERAL DAYS
3. WORRYING TOO MUCH ABOUT DIFFERENT THINGS: SEVERAL DAYS
1. FEELING NERVOUS, ANXIOUS, OR ON EDGE: SEVERAL DAYS
7. FEELING AFRAID AS IF SOMETHING AWFUL MIGHT HAPPEN: NOT AT ALL
GAD7 TOTAL SCORE: 7

## 2024-09-23 ASSESSMENT — PATIENT HEALTH QUESTIONNAIRE - PHQ9
SUM OF ALL RESPONSES TO PHQ QUESTIONS 1-9: 6
5. POOR APPETITE OR OVEREATING: SEVERAL DAYS

## 2024-09-23 ASSESSMENT — PAIN SCALES - GENERAL: PAINLEVEL: NO PAIN (0)

## 2024-09-23 NOTE — PROGRESS NOTES
"  Assessment & Plan     Encounter to establish care    Generalized anxiety disorder  Discussed medication options and will trial sertraline. She is hoping for pregnancy in the future, discussed medication in pregnancy. Discussed risks/benefits/side effects, can take 4-6 weeks to see full effect. Follow-up in 1 month for med/mood recheck.   - sertraline (ZOLOFT) 50 MG tablet; Take 1/2 tablet (25 mg) by mouth daily for 1 week, then increase to 1 tablet (50 mg) by mouth daily    Obesity, Class III, BMI 40-49.9 (morbid obesity) (H)  Following with weight management clinic. Needs eval from eating disorder clinic prior to pursuing next steps with weight management clinic.    Pre-diabetes  Lab Results   Component Value Date    A1C 5.9 04/03/2024     Dorsocervical fat pad  Concern with buffalo hump for many years and she thinks it has gotten larger - wonders if from weight gain or other cause  Was recommended to see endocrinology and would like referral  - Adult Endocrinology  Referral; Future    OME (otitis media with effusion), left  Recommend flonase nasal spray. Monitor and follow-up if worsening or no improvement.    Female infertility  Following with OB/GYN    BMI  Estimated body mass index is 40.81 kg/m  as calculated from the following:    Height as of this encounter: 1.549 m (5' 1\").    Weight as of this encounter: 98 kg (216 lb).   Weight management plan: Discussed healthy diet and exercise guidelines and following with weight management clinic    48 minutes spent on the date of the encounter doing chart review, history and exam, documentation and further activities per the note      Subjective   Leann is a 29 year old, presenting for the following health issues:  Establish Care        9/23/2024     7:08 AM   Additional Questions   Roomed by Jennifer Jon VF       Would like to establish care.    History of Present Illness       Vascular Disease:  She presents for follow up of vascular disease.     She never " "takes nitroglycerin. She is not taking daily aspirin.    She eats 2-3 servings of fruits and vegetables daily.She consumes 0 sweetened beverage(s) daily.She exercises with enough effort to increase her heart rate 9 or less minutes per day.  She exercises with enough effort to increase her heart rate 3 or less days per week.   She is taking medications regularly.     New patient to me today    Possible PCOS  Recently seen by OB/GYN for infertility evaluation 4/5/24:  \"Reviewed normal lab and US findings. On the spectrum of PCOS she is not quite meeting the diagnosis based on labs and imaging. Would consider supplementing synthroid 25 mcg to get her TSH less than 2.5, but will defer this until after her weight mgmt consult \"   has appointment for semen analysis in next few weeks  She has periods typically 2 times per year when not on birth control  She will follow-up with OB/GYN after her 's results    Seen by weight management for obesity 8/16/24  Wanting to work on weight management prior really actively trying for pregnancy.   Weight management clinic recommended evaluation at an eating disorder clinic prior to pursuing pharmacologic weight management or further management with weight clinic.  She really does not feel she has an eating disorder.  She will sometimes take a laxative after a large meal but this is because she doesn't always have regular bowel movements.  Appointment with eating disorder clinic through [facility] later this week.    Prediabetes  Lab Results   Component Value Date    A1C 5.9 04/03/2024     Concern with leyla her for many years and she thinks it has gotten larger - wonders if from weight gain or other cause  Was recommended to see endocrinology and would like referral    Seen in urgent care 8/19/24 for chest pressure  EKG was normal  No symptoms since then  She has not had any chest pressure since then    She does have concerns with overall anxiety  No previous " "diagnosis or treatment for anxiety  No concerns with depression    She works a full time job and is a  so does pop up events for that  Works for an [employer] company  Does presentations and demonstrations for job  Started job in March 2024, still in training  Has anxiety related to new job, feels overwhelmed  Feels really anxious thinking about full time job and also preparing for pop up  events    Sometimes wakes up around 2 AM or 3 AM  Lays in bed for a little while and then ends up going on her phone and eventually falls asleep after again 1-2 hours  Sometimes will end up thinking about work and can't go back to sleep so starts working       3/18/2024     9:01 AM 9/23/2024     7:34 AM   PHQ   PHQ-9 Total Score 17 6   Q9: Thoughts of better off dead/self-harm past 2 weeks Not at all Not at all         9/23/2024     7:34 AM   MERCEDES-7 SCORE   Total Score 7     Left ear pressure recently  No pain, hearing changes, drainage  Has had some sinus congestion, thinks maybe allergies      Review of Systems  Constitutional, HEENT, cardiovascular, pulmonary, gi and gu systems are negative, except as otherwise noted.      Objective    /84 (BP Location: Left arm, Patient Position: Sitting, Cuff Size: Adult Large)   Pulse 75   Temp 98  F (36.7  C) (Oral)   Resp 17   Ht 1.549 m (5' 1\")   Wt 98 kg (216 lb)   SpO2 99%   BMI 40.81 kg/m    Body mass index is 40.81 kg/m .  Physical Exam   GENERAL: alert and no distress  EYES: Eyes grossly normal to inspection, PERRL and conjunctivae and sclerae normal  HENT: normal cephalic/atraumatic, right ear: normal: no effusions, no erythema, normal landmarks, and left ear: clear effusion  NECK: dorsocervical fat pad; no adenopathy  RESP: lungs clear to auscultation - no rales, rhonchi or wheezes  CV: regular rate and rhythm, normal S1 S2, no S3 or S4, no murmur, click or rub  NEURO: Normal strength and tone, mentation intact and speech " normal  PSYCH: mentation appears normal, affect normal/bright          Signed Electronically by: Mariama Nava PA-C

## 2024-10-23 ASSESSMENT — ANXIETY QUESTIONNAIRES
2. NOT BEING ABLE TO STOP OR CONTROL WORRYING: SEVERAL DAYS
GAD7 TOTAL SCORE: 3
5. BEING SO RESTLESS THAT IT IS HARD TO SIT STILL: NOT AT ALL
IF YOU CHECKED OFF ANY PROBLEMS ON THIS QUESTIONNAIRE, HOW DIFFICULT HAVE THESE PROBLEMS MADE IT FOR YOU TO DO YOUR WORK, TAKE CARE OF THINGS AT HOME, OR GET ALONG WITH OTHER PEOPLE: NOT DIFFICULT AT ALL
1. FEELING NERVOUS, ANXIOUS, OR ON EDGE: NOT AT ALL
4. TROUBLE RELAXING: NOT AT ALL
GAD7 TOTAL SCORE: 3
7. FEELING AFRAID AS IF SOMETHING AWFUL MIGHT HAPPEN: NOT AT ALL
8. IF YOU CHECKED OFF ANY PROBLEMS, HOW DIFFICULT HAVE THESE MADE IT FOR YOU TO DO YOUR WORK, TAKE CARE OF THINGS AT HOME, OR GET ALONG WITH OTHER PEOPLE?: NOT DIFFICULT AT ALL
6. BECOMING EASILY ANNOYED OR IRRITABLE: SEVERAL DAYS
3. WORRYING TOO MUCH ABOUT DIFFERENT THINGS: SEVERAL DAYS
GAD7 TOTAL SCORE: 3
7. FEELING AFRAID AS IF SOMETHING AWFUL MIGHT HAPPEN: NOT AT ALL

## 2024-10-25 ENCOUNTER — VIRTUAL VISIT (OUTPATIENT)
Dept: FAMILY MEDICINE | Facility: CLINIC | Age: 29
End: 2024-10-25
Attending: PHYSICIAN ASSISTANT
Payer: COMMERCIAL

## 2024-10-25 DIAGNOSIS — R20.2 PARESTHESIAS: ICD-10-CM

## 2024-10-25 DIAGNOSIS — F41.1 GENERALIZED ANXIETY DISORDER: Primary | ICD-10-CM

## 2024-10-25 DIAGNOSIS — E66.01 OBESITY, CLASS III, BMI 40-49.9 (MORBID OBESITY) (H): ICD-10-CM

## 2024-10-25 DIAGNOSIS — R73.03 PRE-DIABETES: ICD-10-CM

## 2024-10-25 PROCEDURE — 99441 PR PHYSICIAN TELEPHONE EVALUATION 5-10 MIN: CPT | Mod: 93 | Performed by: PHYSICIAN ASSISTANT

## 2024-10-25 RX ORDER — BUPROPION HYDROCHLORIDE 150 MG/1
150 TABLET ORAL EVERY MORNING
Qty: 30 TABLET | Refills: 1 | Status: SHIPPED | OUTPATIENT
Start: 2024-10-25

## 2024-10-25 ASSESSMENT — ENCOUNTER SYMPTOMS: NERVOUS/ANXIOUS: 1

## 2024-10-25 NOTE — PROGRESS NOTES
Leann is a 29 year old who is being evaluated via a billable telephone visit.    What phone number would you like to be contacted at? [phone]  How would you like to obtain your AVS? Victorino  Originating Location (pt. Location): Home    Distant Location (provider location):  On-site    Assessment & Plan     Generalized anxiety disorder  She didn't take sertraline long enough to determine if medication was effective. However, she has been reading about wellbutrin and potential to help also with weight and would like to try wellbutrin instead of sertraline. We discussed that wellbutrin may not work as well for anxiety and in some cases could worsen anxiety so other medications are usually recommended when anxiety is primary concern. She would like to try wellbutrin and will monitor anxiety. We will follow-up in 1 month for recheck, earlier if concerns.  - buPROPion (WELLBUTRIN XL) 150 MG 24 hr tablet; Take 1 tablet (150 mg) by mouth every morning.    Obesity, Class III, BMI 40-49.9 (morbid obesity) (H)  Very distressed by weight and has been following with weight management clinic but not getting the help she was hoping for. She doesn't have visit with weight management clinic until January but was hoping to start medication before then. She asked if we could do any weight management here. We can discuss further at her follow-up in 1 month.    Paresthesias  Mild tingling sensation in medial portion of both great toes over the past few weeks. Usually she only notices if she is rubbing her toes against something or if she touches her toes. She additionally has intermittent numbness/tingling in both hands, mostly in mornings if she has been sleeping with arms/wrists bent. She reports tingling is in all fingers and quickly improves with extending the arm/wrists. Upper extremity symptoms are more suspicious for a nerve entrapment such as carpal tunnel or ulnar neuropathy but overall unclear cause and symptoms do not  suggest a specific pattern or diagnosis. She has prediabetes with A1c of 5.9 when last checked 4/3/24. Could recheck A1c along with other labs such as TSH (normal TSH 4/3/24), B12, etc. Also offered referral to neurology for further evaluation. At this time, she declines further work up as symptoms are mild and not progressing or overly bothersome. She will monitor and follow-up if worsening, no improvement, or new symptoms. Could try wrist brace, make sure to wear supportive shoes with wide toe box.    Pre-diabetes  A1c of 5.9 when last checked 4/3/24.    The longitudinal plan of care for the diagnosis(es)/condition(s) as documented were addressed during this visit. Due to the added complexity in care, I will continue to support Leann in the subsequent management and with ongoing continuity of care.      Brent Noriega is a 29 year old, presenting for the following health issues:  Follow Up and Anxiety      10/25/2024     1:16 PM   Additional Questions   Roomed by Jennifer Jon VF     Big toes feeling tingly like pins and needles. Wondering if she needs a referral for this.       Anxiety    History of Present Illness       Mental Health Follow-up:  Patient presents to follow-up on Anxiety.    Patient's anxiety since last visit has been:  Better  The patient is having other symptoms associated with anxiety.  Any significant life events: No  Patient is not feeling anxious or having panic attacks.  Patient has no concerns about alcohol or drug use.      Follow-up after starting sertraline about 1 month ago  She was traveling for a work trip and forgot the sertraline so only took it for about 2 weeks, then was off of it for 1.5 weeks. She restarted medication a few days ago.  Can't really tell if medication has been helpful    Tingling sensation in bilateral great toes for the past few weeks        3/18/2024     9:01 AM 9/23/2024     7:34 AM   PHQ   PHQ-9 Total Score 17 6   Q9: Thoughts of better off dead/self-harm past 2  weeks Not at all  Not at all       Patient-reported         9/23/2024     7:34 AM 10/23/2024     9:38 AM   MERCEDES-7 SCORE   Total Score  3 (minimal anxiety)   Total Score 7 3        Patient-reported           Objective           Vitals:  No vitals were obtained today due to virtual visit.    Physical Exam   General: Alert and no distress //Respiratory: No audible wheeze, cough, or shortness of breath // Psychiatric:  Appropriate affect, tone, and pace of words        Phone call duration: 16 minutes  Signed Electronically by: Mariama Nava PA-C

## 2024-12-02 ENCOUNTER — TELEPHONE (OUTPATIENT)
Dept: OBGYN | Facility: CLINIC | Age: 29
End: 2024-12-02
Payer: COMMERCIAL

## 2024-12-02 NOTE — TELEPHONE ENCOUNTER
[facility] Call Center    Phone Message    May a detailed message be left on voicemail: yes     Reason for Call: Other: Patient called to schedule First NOB. She stated her LMP was 10/29/24, GA 4w6d but her cycles are irregular and she only spots and usually has to wear a liner. She states she did spot but not enough to wear a liner so she had some concerns about that. Patient also is scheduled for 1/22 with Dr. Fitzpatrick she will be 12 wks,1d that was the first available appt in the [facility] with any of the providers. She would like to see Dr. River either in  or [place] but she needs to be seen between 12/27-01/07 which is her 8-10 wks. Per protocols sending TE due to patient requesting a specific provider but also because patient is scheduled past her 10 wks and she has concerns about her LMP. If provider can't see her patient needs a sooner appt. Please contact patient in regards to this message. Thank you      Action Taken: Other: Women's    Travel Screening: Not Applicable     Date of Service:

## 2024-12-02 NOTE — TELEPHONE ENCOUNTER
Call to pt.     Appointments adjusted. US/labs moved up 1 week.     Was able to find appt with Dr Pricila River per pt request. First available scheduled.      Carmelina FOSTER RN  OB/GYN [place]

## 2024-12-04 ENCOUNTER — LAB (OUTPATIENT)
Dept: LAB | Facility: CLINIC | Age: 29
End: 2024-12-04
Payer: COMMERCIAL

## 2024-12-04 DIAGNOSIS — Z34.90 EARLY STAGE OF PREGNANCY: ICD-10-CM

## 2024-12-04 LAB — HCG INTACT+B SERPL-ACNC: 4 MIU/ML

## 2024-12-04 PROCEDURE — 84702 CHORIONIC GONADOTROPIN TEST: CPT

## 2024-12-04 PROCEDURE — 36415 COLL VENOUS BLD VENIPUNCTURE: CPT

## 2025-01-09 ENCOUNTER — MYC MEDICAL ADVICE (OUTPATIENT)
Dept: FAMILY MEDICINE | Facility: CLINIC | Age: 30
End: 2025-01-09
Payer: COMMERCIAL

## 2025-01-13 ENCOUNTER — ALLIED HEALTH/NURSE VISIT (OUTPATIENT)
Dept: FAMILY MEDICINE | Facility: CLINIC | Age: 30
End: 2025-01-13
Payer: COMMERCIAL

## 2025-01-13 VITALS — HEIGHT: 61 IN | WEIGHT: 216.2 LBS | BODY MASS INDEX: 40.82 KG/M2

## 2025-01-13 DIAGNOSIS — Z76.89 ENCOUNTER FOR WEIGHT MANAGEMENT: Primary | ICD-10-CM

## 2025-01-13 PROCEDURE — 99207 PR NO CHARGE NURSE ONLY: CPT

## 2025-01-13 NOTE — PROGRESS NOTES
"Patient came in to get height and weight for a Prior Auth    Estimated body mass index is 40.85 kg/m  as calculated from the following:    Height as of this encounter: 1.549 m (5' 1\").    Weight as of this encounter: 98.1 kg (216 lb 3.2 oz).       Dixie Wolf MA   "

## 2025-02-17 ENCOUNTER — PATIENT OUTREACH (OUTPATIENT)
Dept: CARE COORDINATION | Facility: CLINIC | Age: 30
End: 2025-02-17
Payer: COMMERCIAL

## 2025-03-03 ENCOUNTER — PATIENT OUTREACH (OUTPATIENT)
Dept: CARE COORDINATION | Facility: CLINIC | Age: 30
End: 2025-03-03

## 2025-03-18 ENCOUNTER — MYC MEDICAL ADVICE (OUTPATIENT)
Dept: FAMILY MEDICINE | Facility: CLINIC | Age: 30
End: 2025-03-18
Payer: COMMERCIAL

## 2025-03-19 ENCOUNTER — OFFICE VISIT (OUTPATIENT)
Dept: FAMILY MEDICINE | Facility: CLINIC | Age: 30
End: 2025-03-19
Payer: COMMERCIAL

## 2025-03-19 VITALS
HEIGHT: 61 IN | DIASTOLIC BLOOD PRESSURE: 72 MMHG | RESPIRATION RATE: 20 BRPM | BODY MASS INDEX: 39.55 KG/M2 | OXYGEN SATURATION: 98 % | TEMPERATURE: 98 F | WEIGHT: 209.5 LBS | HEART RATE: 73 BPM | SYSTOLIC BLOOD PRESSURE: 105 MMHG

## 2025-03-19 DIAGNOSIS — T80.90XA INJECTION SITE REACTION, INITIAL ENCOUNTER: Primary | ICD-10-CM

## 2025-03-19 PROCEDURE — 3074F SYST BP LT 130 MM HG: CPT | Performed by: NURSE PRACTITIONER

## 2025-03-19 PROCEDURE — 1126F AMNT PAIN NOTED NONE PRSNT: CPT | Performed by: NURSE PRACTITIONER

## 2025-03-19 PROCEDURE — 99213 OFFICE O/P EST LOW 20 MIN: CPT | Performed by: NURSE PRACTITIONER

## 2025-03-19 PROCEDURE — 3078F DIAST BP <80 MM HG: CPT | Performed by: NURSE PRACTITIONER

## 2025-03-19 RX ORDER — HYDROXYZINE HYDROCHLORIDE 25 MG/1
25 TABLET, FILM COATED ORAL 3 TIMES DAILY PRN
Qty: 15 TABLET | Refills: 0 | Status: SHIPPED | OUTPATIENT
Start: 2025-03-19

## 2025-03-19 RX ORDER — TRIAMCINOLONE ACETONIDE 1 MG/G
CREAM TOPICAL 3 TIMES DAILY
Qty: 15 G | Refills: 0 | Status: SHIPPED | OUTPATIENT
Start: 2025-03-19

## 2025-03-19 ASSESSMENT — PAIN SCALES - GENERAL: PAINLEVEL_OUTOF10: NO PAIN (0)

## 2025-03-19 NOTE — PROGRESS NOTES
"Assessment & Plan   There are no diagnoses linked to this encounter.  No follow-ups on file.  There are no Patient Instructions on file for this visit.  ANIL Lowe CNP [facility] [place]  ============================================  Subjective  No problem-specific Assessment & Plan notes found for this encounter.    Reaction at injection site from zepbound shot, warm,red, and swollen.   Left thigh two weeks ago.  Right thigh on 3/16/25, both times after shot. Right side seems to be spreading.       History of Present Illness       Reason for visit:  Follow up on Zepbound    She eats 2-3 servings of fruits and vegetables daily.She consumes 0 sweetened beverage(s) daily.She exercises with enough effort to increase her heart rate 10 to 19 minutes per day.  She exercises with enough effort to increase her heart rate 4 days per week.   She is taking medications regularly.    Reviewed and updated as needed this visit by Provider                 Review of Systems  ============================================  Objective    /72 (BP Location: Right arm, Patient Position: Sitting, Cuff Size: Adult Regular)   Pulse 73   Temp 98  F (36.7  C) (Oral)   Resp 20   Ht 1.549 m (5' 1\")   Wt 95 kg (209 lb 8 oz)   LMP 01/20/2025 (Exact Date)   SpO2 98%   BMI 39.58 kg/m    Physical Exam   ============================================  ANIL Lowe CNP  Signed Electronically by: ANIL Lowe CNP          " "    Localized redness and itching at tirzepatide injection sites.  Injection this last week is substantially larger and more uncomfortable with itching than injection from last week.     ============================================  Objective    /72 (BP Location: Right arm, Patient Position: Sitting, Cuff Size: Adult Regular)   Pulse 73   Temp 98  F (36.7  C) (Oral)   Resp 20   Ht 1.549 m (5' 1\")   Wt 95 kg (209 lb 8 oz)   LMP 01/20/2025 (Exact Date)   SpO2 98%   BMI 39.58 kg/m    Physical Exam  Constitutional:       Appearance: Normal appearance.   Eyes:      Conjunctiva/sclera: Conjunctivae normal.   Cardiovascular:      Rate and Rhythm: Normal rate and regular rhythm.      Heart sounds: Normal heart sounds.   Pulmonary:      Effort: Pulmonary effort is normal.      Breath sounds: Normal breath sounds.   Skin:     General: Skin is warm and dry.      Findings: Rash present.      Comments: Injection sites from last two weeks with induration, redness and minimal warmth.  Most recent site is approximately 3.5 inches in diameter.  Now also has an adjacent indurated site likely representing urticaria.  See patient images.   Neurological:      Mental Status: She is alert.   Psychiatric:         Mood and Affect: Mood normal.        ============================================  ANIL Lowe CNP  Signed Electronically by: ANIL Lowe CNP          "

## 2025-03-19 NOTE — PATIENT INSTRUCTIONS
Try the hydroxyzine for itching.  Stop the topical benadryl.    You can use triamcinolone on the site for itching rather than the topical benadryl    Follow up wtdebra Leslie for other options for weight management.

## 2025-03-20 NOTE — TELEPHONE ENCOUNTER
Pt needs follow up to discuss medications. Please assist in scheduling. This can be a virtual appointment.    Clair Fernandez RN on 3/20/2025 at 3:44 PM

## 2025-03-24 PROBLEM — F40.231 SEVERE NEEDLE PHOBIA: Status: RESOLVED | Noted: 2018-12-06 | Resolved: 2025-03-24

## 2025-03-24 PROBLEM — F40.231 SEVERE NEEDLE PHOBIA: Status: ACTIVE | Noted: 2018-12-06

## 2025-03-24 ASSESSMENT — ENCOUNTER SYMPTOMS
ABDOMINAL PAIN: 0
FEVER: 0
CONSTIPATION: 0
PALPITATIONS: 0
CHILLS: 0
DYSURIA: 1
CHEST TIGHTNESS: 0
UNEXPECTED WEIGHT CHANGE: 0
SHORTNESS OF BREATH: 0

## 2025-03-25 ENCOUNTER — VIRTUAL VISIT (OUTPATIENT)
Dept: FAMILY MEDICINE | Facility: CLINIC | Age: 30
End: 2025-03-25
Payer: COMMERCIAL

## 2025-03-25 DIAGNOSIS — E66.01 OBESITY, CLASS III, BMI 40-49.9 (MORBID OBESITY) (H): Primary | ICD-10-CM

## 2025-03-25 NOTE — PROGRESS NOTES
"Leann is a 30 year old who is being evaluated via a billable video visit.    How would you like to obtain your AVS? MyChart  If the video visit is dropped, the invitation should be resent by: Text to cell phone: [phone]  Will anyone else be joining your video visit? No      Assessment & Plan     Obesity, Class III, BMI 40-49.9 (morbid obesity) (H)  Will stop Zepbound due to injection site reactions. Discussed options and will try switching to Wegovy. She had been tolerating 5 mg dose of Zepbound so will start with 1 mg dose of Wegovy. We will plan to follow-up in 1 month for recheck but she will stop medication and let me know if she develops similar reaction or if any other concerns. She continues to focus on healthy diet, reduced calorie diet, increased physical activity, and behavioral modifications.  - Semaglutide-Weight Management (WEGOVY) 1 MG/0.5ML pen; Inject 1 mg subcutaneously once a week.    BMI  Estimated body mass index is 39.58 kg/m  as calculated from the following:    Height as of 3/19/25: 1.549 m (5' 1\").    Weight as of 3/19/25: 95 kg (209 lb 8 oz).   Weight management plan: Discussed healthy diet and exercise guidelines    The longitudinal plan of care for the diagnosis(es)/condition(s) as documented were addressed during this visit. Due to the added complexity in care, I will continue to support Leann in the subsequent management and with ongoing continuity of care.    Subjective   Leann is a 30 year old, presenting for the following health issues:  Recheck Medication        3/25/2025     3:47 PM   Additional Questions   Roomed by Jennifer STEWART     History of Present Illness       Reason for visit:  Follow up from zepbound injection site    She eats 2-3 servings of fruits and vegetables daily.She consumes 0 sweetened beverage(s) daily.She exercises with enough effort to increase her heart rate 10 to 19 minutes per day.  She exercises with enough effort to increase her heart rate 4 days per week. " "  She is taking medications regularly.        Medication Followup of Zepbound 5mg  Taking Medication as prescribed: NO-was told to stop taking it by Karla on 3/19 due to an allergic reaction but took last dose on 3/23. Pt states itching is still present but is not as bad as it was before.   Side Effects:  Itching due to possible allergic reaction  Medication Helping Symptoms:  NO-    Developed injection site reaction to Zepbound  Injects on Sundays    Seen in clinic last week on 3/19/25 for evaluation:  \"Reaction at injection site from zepbound shot, warm,red, and swollen.   Left thigh two weeks ago.  Right thigh on 3/16/25, both times after shot. Right side seems to be spreading.\"     Advised to stop zepbound at visit last week but she did take dose as scheduled on Sunday (2 days ago).  This morning she noticed redness and swelling around injection site  Has been using topical steroid cream and flonase nasal spray on skin (she read to use flonase on internet)    No mouth/throat swelling, shortness of breath, wheezing          Objective           Vitals:  No vitals were obtained today due to virtual visit.    Physical Exam   GENERAL: alert and no distress  EYES: Eyes grossly normal to inspection.   RESP: No audible wheeze, cough, or visible cyanosis.    SKIN: Visible skin clear.  NEURO: Cranial nerves grossly intact.  Mentation and speech appropriate for age.  PSYCH: Appropriate affect, tone, and pace of words        Video-Visit Details    Type of service:  Video Visit   Originating Location (pt. Location): Home    Distant Location (provider location):  On-site  Platform used for Video Visit: Alberto  Signed Electronically by: Mariama Nava PA-C    "

## 2025-03-26 ENCOUNTER — TELEPHONE (OUTPATIENT)
Dept: FAMILY MEDICINE | Facility: CLINIC | Age: 30
End: 2025-03-26

## 2025-03-26 NOTE — TELEPHONE ENCOUNTER
Prior Authorization Retail Medication Request    Medication/Dose: Semaglutide-Weight Management (WEGOVY) 1 MG/0.5ML pen  Diagnosis and ICD code (if different than what is on RX):      New/renewal/insurance change PA/secondary ins. PA:  Previously Tried and Failed:    Rationale:      Insurance   Primary: [facility]   Insurance ID: [id number]    Phone: [phone]       Pharmacy Information (if different than what is on RX)  Name: [facility] [address] [place] [address]  Phone: [phone]    Fax: [phone]    Clinic Information  Preferred routing pool for dept communication:     [facility]

## 2025-03-27 NOTE — TELEPHONE ENCOUNTER
PA Initiation    Medication: WEGOVY 1 MG/0.5ML SC SOAJ  Insurance Company: [employer] - Phone [phone] Fax [phone]  Pharmacy Filling the Rx: [address]  Filling Pharmacy Phone: [phone]  Filling Pharmacy Fax: [phone]  Start Date: 3/27/2025

## 2025-04-01 ENCOUNTER — MYC MEDICAL ADVICE (OUTPATIENT)
Dept: FAMILY MEDICINE | Facility: CLINIC | Age: 30
End: 2025-04-01
Payer: COMMERCIAL

## 2025-04-01 NOTE — TELEPHONE ENCOUNTER
Prior Authorization Approval    Medication: WEGOVY 1 MG/0.5ML SC SOAJ  Authorization Effective Date: 4/1/2025  Authorization Expiration Date: 9/30/2025  Approved Dose/Quantity:       Reference #:     Insurance Company: [employer] - Phone [phone] Fax [phone]  Expected CoPay: $    CoPay Card Available:      Financial Assistance Needed:   Which Pharmacy is filling the prescription: [facility] [address] [place] [address]  Pharmacy Notified: YES  Patient Notified: Instructed pharmacy to notify patient once order is ready.

## 2025-04-10 ENCOUNTER — OFFICE VISIT (OUTPATIENT)
Dept: URGENT CARE | Facility: URGENT CARE | Age: 30
End: 2025-04-10
Payer: COMMERCIAL

## 2025-04-10 VITALS
WEIGHT: 209 LBS | BODY MASS INDEX: 39.46 KG/M2 | OXYGEN SATURATION: 100 % | HEART RATE: 79 BPM | DIASTOLIC BLOOD PRESSURE: 82 MMHG | HEIGHT: 61 IN | SYSTOLIC BLOOD PRESSURE: 114 MMHG | TEMPERATURE: 98.9 F

## 2025-04-10 DIAGNOSIS — F41.1 GENERALIZED ANXIETY DISORDER: Primary | ICD-10-CM

## 2025-04-10 DIAGNOSIS — R07.89 ATYPICAL CHEST PAIN: ICD-10-CM

## 2025-04-10 RX ORDER — BUSPIRONE HYDROCHLORIDE 10 MG/1
TABLET ORAL
Qty: 398 TABLET | Refills: 0 | Status: SHIPPED | OUTPATIENT
Start: 2025-04-10 | End: 2025-06-21

## 2025-04-10 RX ORDER — HYDROXYZINE HYDROCHLORIDE 25 MG/1
25-50 TABLET, FILM COATED ORAL 3 TIMES DAILY PRN
Qty: 30 TABLET | Refills: 0 | Status: SHIPPED | OUTPATIENT
Start: 2025-04-10

## 2025-04-10 ASSESSMENT — ENCOUNTER SYMPTOMS
SINUS PAIN: 0
ABDOMINAL PAIN: 0
SORE THROAT: 0
COUGH: 0
SHORTNESS OF BREATH: 0
FACIAL ASYMMETRY: 0
SPEECH DIFFICULTY: 0
SINUS PRESSURE: 0
FEVER: 0
TROUBLE SWALLOWING: 0
FACIAL SWELLING: 0
RHINORRHEA: 0

## 2025-04-10 NOTE — PROGRESS NOTES
Urgent Care Clinic Visit    Chief Complaint   Patient presents with    Urgent Care     X 1 day ago was having some chest pressure- not as much today, feels pressure in left shoulder blade, neck arm, face feels warm. Sometimes feels a little tingly in face, arms feels warm and tingly into wrist. Not sure if related to her anxiety. Did take an aspirin earlier, has one does of Progesterone left, also took one Wellbutrin. Stopped taking Zepbound because of a reaction.                4/10/2025     4:08 PM   Additional Questions   Roomed by Ann SANTORO   Accompanied by self

## 2025-04-10 NOTE — PROGRESS NOTES
Assessment & Plan:        ICD-10-CM    1. Generalized anxiety disorder  F41.1 hydrOXYzine HCl (ATARAX) 25 MG tablet     busPIRone (BUSPAR) 10 MG tablet      2. Atypical chest pain  R07.89 EKG 12-lead, tracing only            Plan/Clinical Decision Makin) Chest pain yesterday and today. Has some tingling in left face and arm. Normal neurological exam.   EKG: Sinus Bradycardia   -Nonspecific T-abnormality.    Compared to past EKG from 24 and similar.   Having a bit of stress in life. Suspect due to anxiety.   Discussed lifestyle changes to help with cardiac risks.     2) MERCEDES  Went off sertraline in past and on Wellbutrin.   Increased stress with neighbor and now having mostly anxiety.   Not interested in going back on sertraline. Discussed option of buspar with wellbutrin.   Will taper up on that medication as tolerated.   Can use hydroxyzine for as needed anxiety. Reviewed med side effects.     Return if symptoms worsen or fail to improve, for with primary care provider.     At the end of the encounter, I discussed results, diagnosis, medications. Discussed red flags for immediate return to clinic/ER, as well as indications for follow up if no improvement. Patient understood and agreed to plan. Patient was stable for discharge.        Carmelina Root PA-C on 4/10/2025 at 4:41 PM      40 minutes spent on the date of the encounter doing chart review, history and exam, documentation and further activities per the note      Subjective:     HPI:    Leann is a 30 year old female who presents to clinic today for the following health issues:  Chief Complaint   Patient presents with    Urgent Care     X 1 day ago was having some chest pressure- not as much today, feels pressure in left shoulder blade, neck arm, face feels warm. Sometimes feels a little tingly in face, arms feels warm and tingly into wrist. Not sure if related to her anxiety. Did take an aspirin earlier, has one does of Progesterone left, also  "took one Wellbutrin. Stopped taking Zepbound because of a reaction.      HPI    Tingling in left arm. Slight tingling feeling in left side of face.   Yesterday had left sided chest pain. Took some ASA yesterday and that helped. Today developed pressure again and took ASA and felt fine.     No personal hx of heart problems.     Last year in August seen in urgent care for similar symptoms.   EKG was normal. Symptoms thought to be due to MSK strain and secondary anxiety.   Did follow up with primary and was prescribed with sertraline.     Some stresses in life. Has issues with neighbor with mental health issues.     Review of Systems   Constitutional:  Negative for fever.   HENT:  Negative for congestion, dental problem, ear pain, facial swelling, rhinorrhea, sinus pressure, sinus pain, sore throat and trouble swallowing.    Eyes:  Negative for visual disturbance.   Respiratory:  Negative for cough and shortness of breath.    Cardiovascular:  Positive for chest pain. Negative for leg swelling.   Gastrointestinal:  Negative for abdominal pain.   Neurological:  Negative for syncope, facial asymmetry and speech difficulty.         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 NAD.  Head Exam: " Normocephalic, atraumatic.  Eye Exam:   non icteric/injection.    Ear Exam: TMs grey without bulging. Normal canals.  Normal pinna.  Nose Exam: Normal external nose.    OroPharynx Exam:  Moist mucous membranes. No erythema, pharynx without exudate or hypertrophy.  Neck/Thyroid Exam:  No LAD.   Chest/Respiratory Exam: CTAB.  Cardiovascular Exam: RRR. No murmur or rubs. No edema.   Psych: good affect      Results:  No results found for any visits on 04/10/25.

## 2025-04-27 ENCOUNTER — HEALTH MAINTENANCE LETTER (OUTPATIENT)
Age: 30
End: 2025-04-27

## 2025-04-28 ENCOUNTER — VIRTUAL VISIT (OUTPATIENT)
Dept: FAMILY MEDICINE | Facility: CLINIC | Age: 30
End: 2025-04-28
Attending: PHYSICIAN ASSISTANT
Payer: COMMERCIAL

## 2025-04-28 DIAGNOSIS — E66.813 OBESITY, CLASS III, BMI 40-49.9 (MORBID OBESITY) (H): Primary | ICD-10-CM

## 2025-04-28 DIAGNOSIS — F41.1 GENERALIZED ANXIETY DISORDER: ICD-10-CM

## 2025-04-28 PROCEDURE — 98006 SYNCH AUDIO-VIDEO EST MOD 30: CPT | Performed by: PHYSICIAN ASSISTANT

## 2025-04-28 RX ORDER — BUPROPION HYDROCHLORIDE 150 MG/1
150 TABLET ORAL EVERY MORNING
Qty: 90 TABLET | Refills: 1 | Status: SHIPPED | OUTPATIENT
Start: 2025-04-28

## 2025-04-28 RX ORDER — HYDROXYZINE HYDROCHLORIDE 25 MG/1
25 TABLET, FILM COATED ORAL 3 TIMES DAILY PRN
Qty: 30 TABLET | Refills: 2 | Status: SHIPPED | OUTPATIENT
Start: 2025-04-28

## 2025-04-28 NOTE — PROGRESS NOTES
Leann is a 30 year old who is being evaluated via a billable video visit.    How would you like to obtain your AVS? MyChart  If the video visit is dropped, the invitation should be resent by: Text to cell phone: [phone]  Will anyone else be joining your video visit? No      Assessment & Plan     Obesity, Class III, BMI 40-49.9 (morbid obesity) (H)  Start with 0.5 mg once weekly for 4 weeks, then increase to 1 mg weekly. We will plan to follow-up in a little over 2 months when she is back from her trip to [facility].  - Semaglutide-Weight Management (WEGOVY) 1 MG/0.5ML pen; Inject 1 mg subcutaneously once a week.    Generalized anxiety disorder  Chronic, stable. Increased anxiety a few weeks ago due to situation with neighbor but feeling better now. Was given rx for buspirone but is not taking. Continue wellbutrin. Taking hydroxyzine at night to help with sleep/anxiety.  - hydrOXYzine HCl (ATARAX) 25 MG tablet; Take 1 tablet (25 mg) by mouth 3 times daily as needed for anxiety.  - buPROPion (WELLBUTRIN XL) 150 MG 24 hr tablet; Take 1 tablet (150 mg) by mouth every morning.    The longitudinal plan of care for the diagnosis(es)/condition(s) as documented were addressed during this visit. Due to the added complexity in care, I will continue to support Leann in the subsequent management and with ongoing continuity of care.      Subjective   Leann is a 30 year old, presenting for the following health issues:  Recheck Medication        4/28/2025     1:22 PM   Additional Questions   Roomed by fantasma aguilar   Accompanied by ;f         4/28/2025     1:22 PM   Patient Reported Additional Medications   Patient reports taking the following new medications na     HPI      Was able to  Wegovy rx but hasn't started it yet  She has rx for both 0.5 mg dose and 1 mg dose  Planning on starting either this week on Wednesday or on Sunday    Traveling to [facility] in next 3 weeks  Leaving 5/20  Getting back 7/4        Objective            Vitals:  No vitals were obtained today due to virtual visit.    Physical Exam   GENERAL: alert and no distress  EYES: Eyes grossly normal to inspection.   RESP: No audible wheeze, cough, or visible cyanosis.    SKIN: Visible skin clear.  NEURO: Cranial nerves grossly intact.  Mentation and speech appropriate for age.  PSYCH: Appropriate affect, tone, and pace of words        Video-Visit Details    Type of service:  Video Visit   Originating Location (pt. Location): Home    Distant Location (provider location):  On-site  Platform used for Video Visit: Alberto  Signed Electronically by: Mariama Nava PA-C

## 2025-06-05 ENCOUNTER — APPOINTMENT (OUTPATIENT)
Dept: MRI IMAGING | Facility: CLINIC | Age: 30
End: 2025-06-05
Attending: EMERGENCY MEDICINE
Payer: COMMERCIAL

## 2025-06-05 ENCOUNTER — HOSPITAL ENCOUNTER (EMERGENCY)
Facility: CLINIC | Age: 30
Discharge: HOME | End: 2025-06-05
Attending: EMERGENCY MEDICINE
Payer: COMMERCIAL

## 2025-06-05 VITALS
HEART RATE: 61 BPM | RESPIRATION RATE: 20 BRPM | BODY MASS INDEX: 39.92 KG/M2 | DIASTOLIC BLOOD PRESSURE: 82 MMHG | OXYGEN SATURATION: 100 % | HEIGHT: 61 IN | WEIGHT: 211.42 LBS | SYSTOLIC BLOOD PRESSURE: 120 MMHG | TEMPERATURE: 98.2 F

## 2025-06-05 DIAGNOSIS — R42 DIZZINESS: ICD-10-CM

## 2025-06-05 DIAGNOSIS — R19.7 DIARRHEA, UNSPECIFIED TYPE: ICD-10-CM

## 2025-06-05 LAB
ALBUMIN SERPL BCG-MCNC: 4.6 G/DL (ref 3.5–5.2)
ALBUMIN UR-MCNC: NEGATIVE MG/DL
ALP SERPL-CCNC: 90 U/L (ref 40–150)
ALT SERPL W P-5'-P-CCNC: 41 U/L (ref 0–50)
ANION GAP SERPL CALCULATED.3IONS-SCNC: 12 MMOL/L (ref 7–15)
APPEARANCE UR: CLEAR
AST SERPL W P-5'-P-CCNC: 25 U/L (ref 0–45)
ATRIAL RATE - MUSE: 88 BPM
BASOPHILS # BLD AUTO: 0.1 10E3/UL (ref 0–0.2)
BASOPHILS NFR BLD AUTO: 1 %
BILIRUB SERPL-MCNC: 0.4 MG/DL
BILIRUB UR QL STRIP: NEGATIVE
BUN SERPL-MCNC: 9.7 MG/DL (ref 6–20)
CALCIUM SERPL-MCNC: 9.7 MG/DL (ref 8.8–10.4)
CHLORIDE SERPL-SCNC: 103 MMOL/L (ref 98–107)
COLOR UR AUTO: ABNORMAL
CREAT SERPL-MCNC: 0.85 MG/DL (ref 0.51–0.95)
DIASTOLIC BLOOD PRESSURE - MUSE: NORMAL MMHG
EGFRCR SERPLBLD CKD-EPI 2021: >90 ML/MIN/1.73M2
EOSINOPHIL # BLD AUTO: 0.2 10E3/UL (ref 0–0.7)
EOSINOPHIL NFR BLD AUTO: 2 %
ERYTHROCYTE [DISTWIDTH] IN BLOOD BY AUTOMATED COUNT: 13.6 % (ref 10–15)
GLUCOSE SERPL-MCNC: 127 MG/DL (ref 70–99)
GLUCOSE UR STRIP-MCNC: NEGATIVE MG/DL
HCG SERPL QL: NEGATIVE
HCO3 SERPL-SCNC: 24 MMOL/L (ref 22–29)
HCT VFR BLD AUTO: 45.1 % (ref 35–47)
HGB BLD-MCNC: 14.7 G/DL (ref 11.7–15.7)
HGB UR QL STRIP: NEGATIVE
HOLD SPECIMEN: NORMAL
IMM GRANULOCYTES # BLD: 0.1 10E3/UL
IMM GRANULOCYTES NFR BLD: 1 %
INTERPRETATION ECG - MUSE: NORMAL
KETONES UR STRIP-MCNC: NEGATIVE MG/DL
LEUKOCYTE ESTERASE UR QL STRIP: NEGATIVE
LYMPHOCYTES # BLD AUTO: 2.6 10E3/UL (ref 0.8–5.3)
LYMPHOCYTES NFR BLD AUTO: 22 %
MAGNESIUM SERPL-MCNC: 2.1 MG/DL (ref 1.7–2.3)
MCH RBC QN AUTO: 28.4 PG (ref 26.5–33)
MCHC RBC AUTO-ENTMCNC: 32.6 G/DL (ref 31.5–36.5)
MCV RBC AUTO: 87 FL (ref 78–100)
MONOCYTES # BLD AUTO: 0.7 10E3/UL (ref 0–1.3)
MONOCYTES NFR BLD AUTO: 6 %
MONOCYTES NFR BLD AUTO: NEGATIVE %
MUCOUS THREADS #/AREA URNS LPF: PRESENT /LPF
NEUTROPHILS # BLD AUTO: 8.3 10E3/UL (ref 1.6–8.3)
NEUTROPHILS NFR BLD AUTO: 70 %
NITRATE UR QL: NEGATIVE
NRBC # BLD AUTO: 0 10E3/UL
NRBC BLD AUTO-RTO: 0 /100
P AXIS - MUSE: 20 DEGREES
PH UR STRIP: 7.5 [PH] (ref 5–7)
PLATELET # BLD AUTO: 293 10E3/UL (ref 150–450)
POTASSIUM SERPL-SCNC: 4.1 MMOL/L (ref 3.4–5.3)
PR INTERVAL - MUSE: 162 MS
PROT SERPL-MCNC: 7.6 G/DL (ref 6.4–8.3)
QRS DURATION - MUSE: 86 MS
QT - MUSE: 380 MS
QTC - MUSE: 459 MS
R AXIS - MUSE: 29 DEGREES
RBC # BLD AUTO: 5.17 10E6/UL (ref 3.8–5.2)
RBC URINE: <1 /HPF
SODIUM SERPL-SCNC: 139 MMOL/L (ref 135–145)
SP GR UR STRIP: 1.02 (ref 1–1.03)
SQUAMOUS EPITHELIAL: 3 /HPF
SYSTOLIC BLOOD PRESSURE - MUSE: NORMAL MMHG
T AXIS - MUSE: -8 DEGREES
UROBILINOGEN UR STRIP-MCNC: NORMAL MG/DL
VENTRICULAR RATE- MUSE: 88 BPM
WBC # BLD AUTO: 11.9 10E3/UL (ref 4–11)
WBC URINE: 1 /HPF

## 2025-06-05 PROCEDURE — 84703 CHORIONIC GONADOTROPIN ASSAY: CPT | Performed by: EMERGENCY MEDICINE

## 2025-06-05 PROCEDURE — 80053 COMPREHEN METABOLIC PANEL: CPT | Performed by: EMERGENCY MEDICINE

## 2025-06-05 PROCEDURE — 86308 HETEROPHILE ANTIBODY SCREEN: CPT | Performed by: EMERGENCY MEDICINE

## 2025-06-05 PROCEDURE — 83735 ASSAY OF MAGNESIUM: CPT | Performed by: EMERGENCY MEDICINE

## 2025-06-05 PROCEDURE — 36415 COLL VENOUS BLD VENIPUNCTURE: CPT | Performed by: EMERGENCY MEDICINE

## 2025-06-05 PROCEDURE — 96360 HYDRATION IV INFUSION INIT: CPT | Mod: 59

## 2025-06-05 PROCEDURE — 258N000003 HC RX IP 258 OP 636: Performed by: EMERGENCY MEDICINE

## 2025-06-05 PROCEDURE — 70553 MRI BRAIN STEM W/O & W/DYE: CPT

## 2025-06-05 PROCEDURE — 81003 URINALYSIS AUTO W/O SCOPE: CPT | Performed by: EMERGENCY MEDICINE

## 2025-06-05 PROCEDURE — A9585 GADOBUTROL INJECTION: HCPCS | Performed by: EMERGENCY MEDICINE

## 2025-06-05 PROCEDURE — 70549 MR ANGIOGRAPH NECK W/O&W/DYE: CPT

## 2025-06-05 PROCEDURE — 70544 MR ANGIOGRAPHY HEAD W/O DYE: CPT | Mod: 59

## 2025-06-05 PROCEDURE — 255N000002 HC RX 255 OP 636: Performed by: EMERGENCY MEDICINE

## 2025-06-05 PROCEDURE — 85004 AUTOMATED DIFF WBC COUNT: CPT | Performed by: EMERGENCY MEDICINE

## 2025-06-05 PROCEDURE — 93005 ELECTROCARDIOGRAM TRACING: CPT

## 2025-06-05 PROCEDURE — 99285 EMERGENCY DEPT VISIT HI MDM: CPT | Mod: 25

## 2025-06-05 PROCEDURE — 85025 COMPLETE CBC W/AUTO DIFF WBC: CPT | Performed by: EMERGENCY MEDICINE

## 2025-06-05 PROCEDURE — 81001 URINALYSIS AUTO W/SCOPE: CPT | Performed by: EMERGENCY MEDICINE

## 2025-06-05 RX ORDER — GADOBUTROL 604.72 MG/ML
10 INJECTION INTRAVENOUS ONCE
Status: COMPLETED | OUTPATIENT
Start: 2025-06-05 | End: 2025-06-05

## 2025-06-05 RX ORDER — ONDANSETRON 4 MG/1
4 TABLET, ORALLY DISINTEGRATING ORAL EVERY 8 HOURS PRN
Qty: 10 TABLET | Refills: 0 | Status: SHIPPED | OUTPATIENT
Start: 2025-06-05 | End: 2025-06-08

## 2025-06-05 RX ADMIN — SODIUM CHLORIDE 1000 ML: 0.9 INJECTION, SOLUTION INTRAVENOUS at 20:12

## 2025-06-05 RX ADMIN — GADOBUTROL 10 ML: 604.72 INJECTION INTRAVENOUS at 21:41

## 2025-06-05 ASSESSMENT — COLUMBIA-SUICIDE SEVERITY RATING SCALE - C-SSRS
6. HAVE YOU EVER DONE ANYTHING, STARTED TO DO ANYTHING, OR PREPARED TO DO ANYTHING TO END YOUR LIFE?: NO
2. HAVE YOU ACTUALLY HAD ANY THOUGHTS OF KILLING YOURSELF IN THE PAST MONTH?: NO
1. IN THE PAST MONTH, HAVE YOU WISHED YOU WERE DEAD OR WISHED YOU COULD GO TO SLEEP AND NOT WAKE UP?: NO

## 2025-06-05 ASSESSMENT — ACTIVITIES OF DAILY LIVING (ADL)
ADLS_ACUITY_SCORE: 41

## 2025-06-06 LAB
ATRIAL RATE - MUSE: 88 BPM
DIASTOLIC BLOOD PRESSURE - MUSE: NORMAL MMHG
INTERPRETATION ECG - MUSE: NORMAL
P AXIS - MUSE: 20 DEGREES
PR INTERVAL - MUSE: 162 MS
QRS DURATION - MUSE: 86 MS
QT - MUSE: 380 MS
QTC - MUSE: 459 MS
R AXIS - MUSE: 29 DEGREES
SYSTOLIC BLOOD PRESSURE - MUSE: NORMAL MMHG
T AXIS - MUSE: -8 DEGREES
VENTRICULAR RATE- MUSE: 88 BPM

## 2025-06-06 NOTE — ED PROVIDER NOTES
Emergency Department Note      History of Present Illness     Chief Complaint   Headache      HPI   Alfonso Riddle is a 30 year old female who presents to the ED with generalized fatigue and headache.  She was recently traveling in [facility] and returned home 2 hours ago.  9 days ago she began to have diarrhea that lasted 24 to 48 hours.  She took Imodium and it gradually tapered off.  5 days ago she began to have waxing and waning headaches and a sensation of tingling and paresthesias.  These had been bilateral but now seem to be more prominent on the right side especially the face.  She says her headache currently is mild and dull and across the front.  No trauma to the head.  She did get some mosquito bites on her legs.  She says they started as a fluid-filled bullae which then drained.  Most of them have resolved.  She denies any fever or neck stiffness.  No confusion.  Other [place] travelers had some bug bites but nobody became ill the way she did.  She denies any chest pain or shortness of breath.  She says she used a product called [place] Pur which was a vitamin provided by a friend.  She took 1 or 2 doses and felt that it coincided with when she felt ill.  No leg swelling or hemoptysis.  She is able to ambulate but feels excessively weak generally.  She initially wondered whether her symptoms could be related to anxiety.    Independent Historian   Family present and provides additional history.    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       Medications   ondansetron (ZOFRAN ODT) 4 MG ODT tab  buPROPion (WELLBUTRIN XL) 150 MG 24 hr tablet  hydrOXYzine HCl (ATARAX) 25 MG tablet  Prenatal Vit-Fe Fumarate-FA (PRENATAL MULTIVITAMIN  PLUS IRON) 27-1 MG TABS  Semaglutide-Weight Management (WEGOVY) 1 MG/0.5ML pen  triamcinolone (KENALOG) 0.1 % external  "cream        Surgical History   Past Surgical History:   Procedure Laterality Date    WISDOM TOOTH EXTRACTION         Physical Exam     Patient Vitals for the past 24 hrs:   BP Temp Temp src Pulse Resp SpO2 Height Weight   06/05/25 2112 -- -- -- -- -- 100 % -- --   06/05/25 2111 -- -- -- -- -- 100 % -- --   06/05/25 2110 120/82 -- -- 61 -- -- -- --   06/05/25 1919 (!) 132/98 98.2  F (36.8  C) Temporal 86 20 100 % 1.549 m (5' 1\") 95.9 kg (211 lb 6.7 oz)     Physical Exam  Constitutional:       General: She is not in acute distress.     Appearance: Normal appearance. She is not diaphoretic.   HENT:      Head: Atraumatic.      Right Ear: External ear normal.      Left Ear: External ear normal.      Mouth/Throat:      Mouth: Mucous membranes are moist.   Eyes:      General: No scleral icterus.     Conjunctiva/sclera: Conjunctivae normal.   Cardiovascular:      Rate and Rhythm: Normal rate and regular rhythm.      Heart sounds: Normal heart sounds.   Pulmonary:      Effort: No respiratory distress.      Breath sounds: Normal breath sounds.   Abdominal:      General: Abdomen is flat. There is no distension.      Tenderness: There is no abdominal tenderness.   Musculoskeletal:      Cervical back: Neck supple.      Right lower leg: No edema.      Left lower leg: No edema.   Skin:     General: Skin is warm.      Comments: Patient has scattered lesions that appear to be insect bites over the pretibial areas bilaterally.  She has 1 area over the lower tibia on the right with a fluid-filled bullae.  No evidence of surrounding erythema.  No crepitance.   Neurological:      General: No focal deficit present.      Mental Status: She is alert and oriented to person, place, and time.      Comments: Speech is fluent.  Gait is normal.  Strength 5 out of 5 for  in the hands as well as dorsi and plantarflexion of the feet.  Flexion of the hips 5 out of 5.  Sensation light touch intact and symmetric over the face, arms, legs.  " Reflexes 1+ and equal at the patella and Achilles tendon.   Psychiatric:         Mood and Affect: Mood normal.         Behavior: Behavior normal.           Diagnostics     Lab Results   Labs Ordered and Resulted from Time of ED Arrival to Time of ED Departure   COMPREHENSIVE METABOLIC PANEL - Abnormal       Result Value    Sodium 139      Potassium 4.1      Carbon Dioxide (CO2) 24      Anion Gap 12      Urea Nitrogen 9.7      Creatinine 0.85      GFR Estimate >90      Calcium 9.7      Chloride 103      Glucose 127 (*)     Alkaline Phosphatase 90      AST 25      ALT 41      Protein Total 7.6      Albumin 4.6      Bilirubin Total 0.4     ROUTINE UA WITH MICROSCOPIC REFLEX TO CULTURE - Abnormal    Color Urine Light Yellow      Appearance Urine Clear      Glucose Urine Negative      Bilirubin Urine Negative      Ketones Urine Negative      Specific Gravity Urine 1.019      Blood Urine Negative      pH Urine 7.5 (*)     Protein Albumin Urine Negative      Urobilinogen Urine Normal      Nitrite Urine Negative      Leukocyte Esterase Urine Negative      Mucus Urine Present (*)     RBC Urine <1      WBC Urine 1      Squamous Epithelials Urine 3 (*)    CBC WITH PLATELETS AND DIFFERENTIAL - Abnormal    WBC Count 11.9 (*)     RBC Count 5.17      Hemoglobin 14.7      Hematocrit 45.1      MCV 87      MCH 28.4      MCHC 32.6      RDW 13.6      Platelet Count 293      % Neutrophils 70      % Lymphocytes 22      % Monocytes 6      % Eosinophils 2      % Basophils 1      % Immature Granulocytes 1      NRBCs per 100 WBC 0      Absolute Neutrophils 8.3      Absolute Lymphocytes 2.6      Absolute Monocytes 0.7      Absolute Eosinophils 0.2      Absolute Basophils 0.1      Absolute Immature Granulocytes 0.1      Absolute NRBCs 0.0     MAGNESIUM - Normal    Magnesium 2.1     HCG QUALITATIVE PREGNANCY - Normal    hCG Serum Qualitative Negative     MONONUCLEOSIS SCREEN - Normal    Mononucleosis Screen Negative     ENTERIC BACTERIA AND  VIRUS PANEL BY PCR       Imaging   MR Brain w/o & w Contrast   Final Result   IMPRESSION:   1.  Unremarkable MRI of the brain.       2.  No acute infarct.      MRA Angiogram Head w/o Contrast   Final Result   IMPRESSION:   1.  No intracranial arterial large vessel occlusion or significant stenosis.      MRA Angiogram Neck w/o & w Contrast   Final Result   IMPRESSION:   1.  No significant stenosis or occlusion. No cervical artery dissection.          EKG   ECG results from 06/05/25   EKG 12 lead     Value    Systolic Blood Pressure     Diastolic Blood Pressure     Ventricular Rate 88    Atrial Rate 88    SC Interval 162    QRS Duration 86        QTc 459    P Axis 20    R AXIS 29    T Axis -8    Interpretation ECG      Sinus rhythm  Nonspecific T wave abnormality  Abnormal ECG  No previous ECGs available        ED Course      Medications Administered   Medications   sodium chloride 0.9% BOLUS 1,000 mL (0 mLs Intravenous Stopped 6/5/25 2132)   gadobutrol (GADAVIST) injection 10 mL (10 mLs Intravenous $Given 6/5/25 2141)   sodium chloride (PF) 0.9% PF flush 60 mL (100 mLs Intravenous $Given 6/5/25 2141)       Medical Decision Making / Diagnosis     IFEANYI Riddle is a 30 year old female who presents to the ED for evaluation of generalized weakness.  She returned today from traveling in [facility].  While she was there she had a diarrheal illness.  She had bug bites.  She feels that she has not been tolerating much orally as it makes her nauseous.  She was generally weak and lightheaded at home and presented to the ED.  She initially complained of some paresthesias and tingling.  This was most prominent on the right side of the face.  MRI/MRA negative.  Laboratory workup overall reassuring.  White, newly elevated with normal differential.    I discussed with the patient that this could be a viral diarrheal illness.  She is not able to provide a stool sample here but will return 1 to the ED.  Bacterial illness or  parasite could be a consideration as well.    With the patient's bug bites she could be having a insect borne illness.  There is no nuchal rigidity or fever or confusion to suggest encephalitis or meningitis.  No evidence of Guillain-Barré syndrome.  Reflexes present in the legs.  Strength and sensation normal.  She is able to ambulate without any difficulty.    At this point the patient is appropriate for outpatient management.  She will Zofran available so she can keep some fluids down.  She was advised not to travel until she is feeling improved.  We discussed the potential for an evolving condition that is not yet completely apparent.  Therefore she was advised to keep a low threshold for return to the ED.  She will otherwise follow-up in her clinic.    Disposition   The patient was discharged.     Diagnosis     ICD-10-CM    1. Dizziness  R42       2. Diarrhea, unspecified type  R19.7 Enteric Bacteria and Virus Panel by AUNG Stool           Discharge Medications   New Prescriptions    ONDANSETRON (ZOFRAN ODT) 4 MG ODT TAB    Take 1 tablet (4 mg) by mouth every 8 hours as needed.               Aleksander Martin MD  06/05/25 2873

## 2025-06-06 NOTE — ED TRIAGE NOTES
Pt arrives complaining of lightheaded and headache. Just landed off a plan from [facility] 2 hours PTA. While in [place] had diarrhea. 2 days ago face tingling and headache. Now headache and feels like her speech is slower. BEFAST negative in triage. Nausea no vomiting. No tingling now. No change in sensation now. Denies vision changes.      Triage Assessment (Adult)       Row Name 06/05/25 1920          Triage Assessment    Airway WDL WDL        Respiratory WDL    Respiratory WDL WDL        Skin Circulation/Temperature WDL    Skin Circulation/Temperature WDL WDL        Cardiac WDL    Cardiac WDL WDL        Peripheral/Neurovascular WDL    Peripheral Neurovascular WDL WDL        Cognitive/Neuro/Behavioral WDL    Cognitive/Neuro/Behavioral WDL X

## 2025-06-06 NOTE — DISCHARGE INSTRUCTIONS
Follow-up with your clinic in 2 days.    You should plan to rest for at least 2 to 3 days and drink plenty of fluids.  Take a soft diet.    Return to the ER for any new or worsening symptoms such as worsening headache, neck stiffness, fever, any new numbness or weakness, or difficulty walking.    Please return a stool sample to the hospital laboratory.

## 2025-06-08 ENCOUNTER — HOSPITAL ENCOUNTER (EMERGENCY)
Facility: CLINIC | Age: 30
Discharge: HOME | End: 2025-06-08
Attending: EMERGENCY MEDICINE | Admitting: EMERGENCY MEDICINE
Payer: COMMERCIAL

## 2025-06-08 VITALS
HEART RATE: 61 BPM | RESPIRATION RATE: 20 BRPM | TEMPERATURE: 97 F | SYSTOLIC BLOOD PRESSURE: 111 MMHG | OXYGEN SATURATION: 100 % | BODY MASS INDEX: 39.53 KG/M2 | WEIGHT: 209.22 LBS | DIASTOLIC BLOOD PRESSURE: 44 MMHG

## 2025-06-08 DIAGNOSIS — D72.829 LEUKOCYTOSIS, UNSPECIFIED TYPE: ICD-10-CM

## 2025-06-08 DIAGNOSIS — R42 NONSPECIFIC DIZZINESS: ICD-10-CM

## 2025-06-08 DIAGNOSIS — R51.9 ACUTE NONINTRACTABLE HEADACHE, UNSPECIFIED HEADACHE TYPE: ICD-10-CM

## 2025-06-08 LAB
ALBUMIN SERPL BCG-MCNC: 4.5 G/DL (ref 3.5–5.2)
ALBUMIN UR-MCNC: NEGATIVE MG/DL
ALP SERPL-CCNC: 98 U/L (ref 40–150)
ALT SERPL W P-5'-P-CCNC: 36 U/L (ref 0–50)
ANION GAP SERPL CALCULATED.3IONS-SCNC: 13 MMOL/L (ref 7–15)
APPEARANCE UR: CLEAR
AST SERPL W P-5'-P-CCNC: 24 U/L (ref 0–45)
BASOPHILS # BLD AUTO: 0.1 10E3/UL (ref 0–0.2)
BASOPHILS NFR BLD AUTO: 0 %
BILIRUB SERPL-MCNC: 0.3 MG/DL
BILIRUB UR QL STRIP: NEGATIVE
BUN SERPL-MCNC: 9.8 MG/DL (ref 6–20)
CALCIUM SERPL-MCNC: 9.5 MG/DL (ref 8.8–10.4)
CHLORIDE SERPL-SCNC: 102 MMOL/L (ref 98–107)
COLOR UR AUTO: ABNORMAL
CREAT SERPL-MCNC: 0.82 MG/DL (ref 0.51–0.95)
EGFRCR SERPLBLD CKD-EPI 2021: >90 ML/MIN/1.73M2
EOSINOPHIL # BLD AUTO: 0.3 10E3/UL (ref 0–0.7)
EOSINOPHIL NFR BLD AUTO: 2 %
ERYTHROCYTE [DISTWIDTH] IN BLOOD BY AUTOMATED COUNT: 13.2 % (ref 10–15)
FLUAV RNA SPEC QL NAA+PROBE: NEGATIVE
FLUBV RNA RESP QL NAA+PROBE: NEGATIVE
GLUCOSE SERPL-MCNC: 122 MG/DL (ref 70–99)
GLUCOSE UR STRIP-MCNC: NEGATIVE MG/DL
HCG SERPL QL: NEGATIVE
HCO3 SERPL-SCNC: 23 MMOL/L (ref 22–29)
HCT VFR BLD AUTO: 46.5 % (ref 35–47)
HGB BLD-MCNC: 15.5 G/DL (ref 11.7–15.7)
HGB UR QL STRIP: NEGATIVE
HOLD SPECIMEN: NORMAL
HOLD SPECIMEN: NORMAL
IMM GRANULOCYTES # BLD: 0.1 10E3/UL
IMM GRANULOCYTES NFR BLD: 1 %
KETONES UR STRIP-MCNC: NEGATIVE MG/DL
LACTATE SERPL-SCNC: 1 MMOL/L (ref 0.7–2)
LEUKOCYTE ESTERASE UR QL STRIP: NEGATIVE
LYMPHOCYTES # BLD AUTO: 3.3 10E3/UL (ref 0.8–5.3)
LYMPHOCYTES NFR BLD AUTO: 17 %
MCH RBC QN AUTO: 28.7 PG (ref 26.5–33)
MCHC RBC AUTO-ENTMCNC: 33.3 G/DL (ref 31.5–36.5)
MCV RBC AUTO: 86 FL (ref 78–100)
MONOCYTES # BLD AUTO: 1.1 10E3/UL (ref 0–1.3)
MONOCYTES NFR BLD AUTO: 6 %
MUCOUS THREADS #/AREA URNS LPF: PRESENT /LPF
NEUTROPHILS # BLD AUTO: 14.5 10E3/UL (ref 1.6–8.3)
NEUTROPHILS NFR BLD AUTO: 75 %
NITRATE UR QL: NEGATIVE
NRBC # BLD AUTO: 0 10E3/UL
NRBC BLD AUTO-RTO: 0 /100
PH UR STRIP: 6.5 [PH] (ref 5–7)
PLATELET # BLD AUTO: 325 10E3/UL (ref 150–450)
POTASSIUM SERPL-SCNC: 3.9 MMOL/L (ref 3.4–5.3)
PROCALCITONIN SERPL IA-MCNC: <0.02 NG/ML
PROT SERPL-MCNC: 7.6 G/DL (ref 6.4–8.3)
RBC # BLD AUTO: 5.41 10E6/UL (ref 3.8–5.2)
RBC URINE: 2 /HPF
RSV RNA SPEC NAA+PROBE: NEGATIVE
SARS-COV-2 RNA RESP QL NAA+PROBE: NEGATIVE
SODIUM SERPL-SCNC: 138 MMOL/L (ref 135–145)
SP GR UR STRIP: 1.03 (ref 1–1.03)
SQUAMOUS EPITHELIAL: 4 /HPF
UROBILINOGEN UR STRIP-MCNC: NORMAL MG/DL
WBC # BLD AUTO: 19.3 10E3/UL (ref 4–11)
WBC URINE: 2 /HPF

## 2025-06-08 PROCEDURE — 99285 EMERGENCY DEPT VISIT HI MDM: CPT | Mod: 25

## 2025-06-08 PROCEDURE — 85025 COMPLETE CBC W/AUTO DIFF WBC: CPT | Performed by: EMERGENCY MEDICINE

## 2025-06-08 PROCEDURE — 84145 PROCALCITONIN (PCT): CPT | Performed by: EMERGENCY MEDICINE

## 2025-06-08 PROCEDURE — 96374 THER/PROPH/DIAG INJ IV PUSH: CPT

## 2025-06-08 PROCEDURE — 96375 TX/PRO/DX INJ NEW DRUG ADDON: CPT

## 2025-06-08 PROCEDURE — 83605 ASSAY OF LACTIC ACID: CPT | Performed by: EMERGENCY MEDICINE

## 2025-06-08 PROCEDURE — 36415 COLL VENOUS BLD VENIPUNCTURE: CPT | Performed by: EMERGENCY MEDICINE

## 2025-06-08 PROCEDURE — 81001 URINALYSIS AUTO W/SCOPE: CPT | Performed by: EMERGENCY MEDICINE

## 2025-06-08 PROCEDURE — 87040 BLOOD CULTURE FOR BACTERIA: CPT | Performed by: EMERGENCY MEDICINE

## 2025-06-08 PROCEDURE — 87637 SARSCOV2&INF A&B&RSV AMP PRB: CPT | Performed by: EMERGENCY MEDICINE

## 2025-06-08 PROCEDURE — 84450 TRANSFERASE (AST) (SGOT): CPT | Performed by: EMERGENCY MEDICINE

## 2025-06-08 PROCEDURE — 96361 HYDRATE IV INFUSION ADD-ON: CPT

## 2025-06-08 PROCEDURE — 258N000003 HC RX IP 258 OP 636: Performed by: EMERGENCY MEDICINE

## 2025-06-08 PROCEDURE — 84703 CHORIONIC GONADOTROPIN ASSAY: CPT | Performed by: EMERGENCY MEDICINE

## 2025-06-08 PROCEDURE — 250N000011 HC RX IP 250 OP 636: Mod: JZ | Performed by: EMERGENCY MEDICINE

## 2025-06-08 RX ORDER — DIPHENHYDRAMINE HYDROCHLORIDE 50 MG/ML
12.5 INJECTION, SOLUTION INTRAMUSCULAR; INTRAVENOUS ONCE
Status: COMPLETED | OUTPATIENT
Start: 2025-06-08 | End: 2025-06-08

## 2025-06-08 RX ORDER — KETOROLAC TROMETHAMINE 15 MG/ML
10 INJECTION, SOLUTION INTRAMUSCULAR; INTRAVENOUS ONCE
Status: COMPLETED | OUTPATIENT
Start: 2025-06-08 | End: 2025-06-08

## 2025-06-08 RX ORDER — METOCLOPRAMIDE HYDROCHLORIDE 5 MG/ML
10 INJECTION INTRAMUSCULAR; INTRAVENOUS ONCE
Status: COMPLETED | OUTPATIENT
Start: 2025-06-08 | End: 2025-06-08

## 2025-06-08 RX ADMIN — METOCLOPRAMIDE 10 MG: 5 INJECTION, SOLUTION INTRAMUSCULAR; INTRAVENOUS at 07:38

## 2025-06-08 RX ADMIN — DIPHENHYDRAMINE HYDROCHLORIDE 12.5 MG: 50 INJECTION, SOLUTION INTRAMUSCULAR; INTRAVENOUS at 07:34

## 2025-06-08 RX ADMIN — SODIUM CHLORIDE 1000 ML: 0.9 INJECTION, SOLUTION INTRAVENOUS at 07:33

## 2025-06-08 RX ADMIN — KETOROLAC TROMETHAMINE 10 MG: 15 INJECTION, SOLUTION INTRAMUSCULAR; INTRAVENOUS at 07:37

## 2025-06-08 ASSESSMENT — ACTIVITIES OF DAILY LIVING (ADL)
ADLS_ACUITY_SCORE: 41

## 2025-06-08 ASSESSMENT — COLUMBIA-SUICIDE SEVERITY RATING SCALE - C-SSRS
2. HAVE YOU ACTUALLY HAD ANY THOUGHTS OF KILLING YOURSELF IN THE PAST MONTH?: NO
6. HAVE YOU EVER DONE ANYTHING, STARTED TO DO ANYTHING, OR PREPARED TO DO ANYTHING TO END YOUR LIFE?: NO
1. IN THE PAST MONTH, HAVE YOU WISHED YOU WERE DEAD OR WISHED YOU COULD GO TO SLEEP AND NOT WAKE UP?: NO

## 2025-06-08 NOTE — ED TRIAGE NOTES
Pt to ER with c/o Ha nausea and fatigue , pt states was seen here with work up done including MRI with no dx made

## 2025-06-08 NOTE — ED PROVIDER NOTES
Emergency Department Note      History of Present Illness     Chief Complaint   Generalized Weakness    HPI   Alfonso Riddle is a 30 year old female who presents to the ED with her  for evaluation of generalized weakness. Alfonso and her  recently traveled to [facility], throughout the trip she felt lightheaded, it was the worst on 06/02 and she felt like she may pass out. On 06/03 she had diarrhea which was resolved with imodium. They returned home on 06/03, beginning on 06/04, she developed a right sided headache, she was seen in the ED. Since she's developed a full body jittery sensation and the headache has moved the the left side and worsened in severity to a 6-7/10. Alfonso also reports fatigue, nausea, and several bad mosquito bites while in [facility]. She hasn't taken anything for the headache, she denies a history of migraines or frequent headaches. Alfonso notes that she had slightly blurry vision on 06/04. No sore throat, runny nose, cough, fever, neck stiffness, rash, urinary symptoms, focal numbness/weakness, or lower back pain. Alfonso had a mosquito bite on her right ankle that popped on 06/07, she noticed red streaking from it in [facility], and the fluid that came from it was yellow and mucous like. No known sick contacts. Additionally, Alfonso took amoxil in [place] for the mosquito bites, she did not finish the course of antibiotics.    Independent Historian   None    Review of External Notes   I reviewed the ER visit from 6/5/2025.  Patient was seen for similar symptoms and had an extensive workup including labs and an MRI/MRA of the head and neck which was unremarkable.    Past Medical History     Medical History and Problem List   Amenorrhea   Diabetes   Female infertility   MERCEDES   Ganglion cyst   Obesity   PCOS   Pre-diabetes   Severe needle phobia    Medications   Atarax   Wellbutrin   Wegovy   Zofran     Surgical History   [place] tooth extraction     Physical Exam     Patient Vitals for the past 24  hrs:   BP Temp Temp src Pulse Resp SpO2 Weight   06/08/25 0815 111/44 -- -- 61 -- -- --   06/08/25 0806 -- -- -- -- -- 100 % --   06/08/25 0800 -- -- -- -- -- 96 % --   06/08/25 0535 (!) 143/104 97  F (36.1  C) Temporal 88 20 98 % 94.9 kg (209 lb 3.5 oz)     Physical Exam  Vitals and nursing note reviewed.   Constitutional:       General: She is not in acute distress.     Appearance: She is ill-appearing. She is not toxic-appearing.   HENT:      Head: Normocephalic and atraumatic.      Right Ear: External ear normal.      Left Ear: External ear normal.      Nose: Nose normal.      Mouth/Throat:      Mouth: Mucous membranes are moist.   Eyes:      Extraocular Movements: Extraocular movements intact.      Conjunctiva/sclera: Conjunctivae normal.      Pupils: Pupils are equal, round, and reactive to light.   Cardiovascular:      Rate and Rhythm: Normal rate and regular rhythm.      Heart sounds: No murmur heard.  Pulmonary:      Effort: Pulmonary effort is normal. No respiratory distress.      Breath sounds: Normal breath sounds. No wheezing, rhonchi or rales.   Abdominal:      General: Abdomen is flat. Bowel sounds are normal. There is no distension.      Palpations: Abdomen is soft.      Tenderness: There is no abdominal tenderness. There is no guarding or rebound.   Musculoskeletal:         General: No deformity or signs of injury.      Cervical back: Full passive range of motion without pain, normal range of motion and neck supple. No rigidity.   Skin:     General: Skin is warm and dry.      Findings: Wound (Several healing insect bites to the bilateral lower extremities without any surrounding erythema or warmth) present. No rash.   Neurological:      Mental Status: She is alert and oriented to person, place, and time.      Cranial Nerves: No cranial nerve deficit.      Sensory: No sensory deficit.      Motor: No weakness.      Gait: Gait normal.   Psychiatric:         Behavior: Behavior normal.            Diagnostics     Lab Results   Labs Ordered and Resulted from Time of ED Arrival to Time of ED Departure   COMPREHENSIVE METABOLIC PANEL - Abnormal       Result Value    Sodium 138      Potassium 3.9      Carbon Dioxide (CO2) 23      Anion Gap 13      Urea Nitrogen 9.8      Creatinine 0.82      GFR Estimate >90      Calcium 9.5      Chloride 102      Glucose 122 (*)     Alkaline Phosphatase 98      AST 24      ALT 36      Protein Total 7.6      Albumin 4.5      Bilirubin Total 0.3     CBC WITH PLATELETS AND DIFFERENTIAL - Abnormal    WBC Count 19.3 (*)     RBC Count 5.41 (*)     Hemoglobin 15.5      Hematocrit 46.5      MCV 86      MCH 28.7      MCHC 33.3      RDW 13.2      Platelet Count 325      % Neutrophils 75      % Lymphocytes 17      % Monocytes 6      % Eosinophils 2      % Basophils 0      % Immature Granulocytes 1      NRBCs per 100 WBC 0      Absolute Neutrophils 14.5 (*)     Absolute Lymphocytes 3.3      Absolute Monocytes 1.1      Absolute Eosinophils 0.3      Absolute Basophils 0.1      Absolute Immature Granulocytes 0.1      Absolute NRBCs 0.0     ROUTINE UA WITH MICROSCOPIC REFLEX TO CULTURE - Abnormal    Color Urine Light Yellow      Appearance Urine Clear      Glucose Urine Negative      Bilirubin Urine Negative      Ketones Urine Negative      Specific Gravity Urine 1.027      Blood Urine Negative      pH Urine 6.5      Protein Albumin Urine Negative      Urobilinogen Urine Normal      Nitrite Urine Negative      Leukocyte Esterase Urine Negative      Mucus Urine Present (*)     RBC Urine 2      WBC Urine 2      Squamous Epithelials Urine 4 (*)    HCG QUALITATIVE PREGNANCY - Normal    hCG Serum Qualitative Negative     PROCALCITONIN - Normal    Procalcitonin <0.02     LACTIC ACID WHOLE BLOOD WITH 1X REPEAT IN 2 HR WHEN >2 - Normal    Lactic Acid, Initial 1.0     INFLUENZA A/B, RSV AND SARS-COV2 PCR - Normal    Influenza A PCR Negative      Influenza B PCR Negative      RSV PCR Negative       SARS CoV2 PCR Negative     BLOOD CULTURE   BLOOD CULTURE     Imaging   No orders to display     Independent Interpretation   None    ED Course      Medications Administered   Medications   sodium chloride 0.9% BOLUS 1,000 mL (0 mLs Intravenous Stopped 6/8/25 1108)   metoclopramide (REGLAN) injection 10 mg (10 mg Intravenous $Given 6/8/25 0738)   ketorolac (TORADOL) injection 10 mg (10 mg Intravenous $Given 6/8/25 0737)   diphenhydrAMINE (BENADRYL) injection 12.5 mg (12.5 mg Intravenous $Given 6/8/25 0734)     Procedures   Procedures     Discussion of Management   None    ED Course   ED Course as of 06/08/25 1417   Sun Jun 08, 2025   0640 I obtained history and examined the patient as noted above.    1055 I rechecked and updated the patient. She feels a lot better after receiving the medications and would like to be discharged home.     Additional Documentation  None    Medical Decision Making / Diagnosis     CMS Diagnoses: None    MIPS   None       MDM   Alfonso Riddle is a 30 year old female who presents with ongoing dizziness and a worsening headache.  She recently traveled to [facility] and had several mosquito bites while there.  She was seen here few days ago and had extensive workup including labs and an MRI/MRA of the head neck which were reassuring.  She continues to deny any fever throughout the course of her illness and denies any neck stiffness or pain.  She denies any focal neurologic deficits currently in the ED.  Her neurologic exam is normal here in the ED today and she has no meningeal signs.  Given that she has been afebrile, and has no neurologic signs on her exam, and has no meningeal signs, I think it is still unlikely that she has meningitis or encephalitis.  Certainly is in the differential and we did consider performing an LP, but the patient was not interested in this at that time.  I have very low suspicion for a bacterial etiology such as bacterial meningitis given that she overall looks well  and has been afebrile without any meningeal signs.  She does have a worsening leukocytosis compared to her previous visit but unclear what the significance of this is.  We did send blood cultures.  Her lactic acid is normal.  There are no other focalizing signs or symptoms to suggest other source of infection or sepsis and again she has been afebrile throughout this entire time.  We did give her a migraine cocktail and she reported significant improvement in her symptoms.  We again discussed option to pursue an LP to look for rare viral meningitis or encephalitis, though I have low suspicion for these.  Patient would prefer to go home at this time and will return to the ER if her symptoms worsen or she develops a fever or any meningeal signs.  I recommend that she try using NSAIDs and acetaminophen as needed for her headache.  She should follow-up closely with her primary care doctor as well.  We discussed return precautions.    Disposition   The patient was discharged.     Diagnosis     ICD-10-CM    1. Acute nonintractable headache, unspecified headache type  R51.9       2. Nonspecific dizziness  R42       3. Leukocytosis, unspecified type  D72.829          Discharge Medications   Discharge Medication List as of 6/8/2025 11:11 AM        Susana DEL REAL, am serving as a scribe at 6:08 AM on 6/8/2025 to document services personally performed by Mekhi Holly MD based on my observations and the provider's statements to me.        Mekhi Holly MD  06/08/25 9501

## 2025-06-08 NOTE — ED NOTES
Neuro CognitiveCognitive/Neuro/Behavioral WDL: all (pt comes in with fatique, weakness, N/V/D, dizziness and H/A to left side of head that started last tues. and has continued after being seen in the ER and having a neg. workup.)Level of Consciousness: alertArousal Level: opens eyes spontaneouslyOrientation: oriented x 4Speech: spontaneous; clearMood/Behavior: hypoactive (quiet, withdrawn)  Tejinder Coma ScaleBest Eye Response: 4-->(E4) spontaneousBest Motor Response: 6-->(M6) obeys commandsBest Verbal Response: 5-->(V5) orientedGlasgow Coma Scale Score: 15  Hand /Ankle StrengthHand , Left: strongHand , Right: strongDorsiflexion, Left: strongDorsiflexion, Right: strongPlantarflexion, Left: strongPlantarflexion, Right: strong  Pupils (CN II)Pupil PERRLA: yesPupil Size Left: 2 mmPupil Size Right: 2 mm  Motor ResponseMotor Response: general motor responseGeneral Motor Response: purposeful motor response

## 2025-06-09 ENCOUNTER — OFFICE VISIT (OUTPATIENT)
Dept: FAMILY MEDICINE | Facility: CLINIC | Age: 30
End: 2025-06-09
Payer: COMMERCIAL

## 2025-06-09 VITALS
OXYGEN SATURATION: 98 % | DIASTOLIC BLOOD PRESSURE: 87 MMHG | RESPIRATION RATE: 20 BRPM | HEART RATE: 79 BPM | SYSTOLIC BLOOD PRESSURE: 131 MMHG | BODY MASS INDEX: 39.44 KG/M2 | TEMPERATURE: 98.1 F | HEIGHT: 61 IN | WEIGHT: 208.9 LBS

## 2025-06-09 DIAGNOSIS — R11.2 NAUSEA AND VOMITING, UNSPECIFIED VOMITING TYPE: ICD-10-CM

## 2025-06-09 DIAGNOSIS — R19.7 DIARRHEA, UNSPECIFIED TYPE: Primary | ICD-10-CM

## 2025-06-09 DIAGNOSIS — D72.829 LEUKOCYTOSIS, UNSPECIFIED TYPE: ICD-10-CM

## 2025-06-09 PROCEDURE — 1126F AMNT PAIN NOTED NONE PRSNT: CPT | Performed by: PHYSICIAN ASSISTANT

## 2025-06-09 PROCEDURE — 3075F SYST BP GE 130 - 139MM HG: CPT | Performed by: PHYSICIAN ASSISTANT

## 2025-06-09 PROCEDURE — 3079F DIAST BP 80-89 MM HG: CPT | Performed by: PHYSICIAN ASSISTANT

## 2025-06-09 PROCEDURE — 99214 OFFICE O/P EST MOD 30 MIN: CPT | Performed by: PHYSICIAN ASSISTANT

## 2025-06-09 PROCEDURE — G2211 COMPLEX E/M VISIT ADD ON: HCPCS | Performed by: PHYSICIAN ASSISTANT

## 2025-06-09 ASSESSMENT — PAIN SCALES - GENERAL: PAINLEVEL_OUTOF10: NO PAIN (0)

## 2025-06-09 NOTE — PROGRESS NOTES
Assessment & Plan     Diarrhea, unspecified type  Consider viral diarrheal illness given recent travel. No bloody stools, fever, or abdominal pain. She has had mild improvement in symptoms since yesterday so deferred repeat or further testing today. We will plan to follow-up in 2 days for visit to recheck symptoms and determine if further evaluation is needed.   - Enteric Bacteria and Virus Panel by AUNG Stool; Future  - Enteric Bacteria and Virus Panel by AUNG Stool    Nausea and vomiting, unspecified vomiting type  She has rx for zofran from ED but has not picked up. Recommend picking up rx and using zofran for nausea. She has been keeping fluids down and urinating normally; labs in ED yesterday did not suggest significant concern for dehydration. Continue with fluids and diet as tolerated. Complete stool test as above and follow-up in 2 days for recheck.    Leukocytosis, unspecified type  Worsening leukocytosis in ED yesterday but normal lactic acid and procalcitonin. No focalizing signs or symptoms to suggest other source of infection or sepsis. She remains afebrile and has had mild improvement in symptoms since yesterday so deferred repeat testing today. We will plan to follow-up in 2 days for visit to recheck symptoms and determine if further evaluation is needed.     Discussed plan of care and reasons to return to clinic or present to the ED (emergency department). Patient in agreement with plan, questions answered.       Brent Noriega is a 30 year old, presenting for the following health issues:  Hospital F/U        6/9/2025    10:16 AM   Additional Questions   Roomed by Chari PALOMARES MA   Accompanied by self         6/9/2025    10:16 AM   Patient Reported Additional Medications   Patient reports taking the following new medications none     HPI      ED/UC Followup:    Facility:  [facility] Emergency Dept  Date of visit: 6/8/2025  Reason for visit: Generalized Weakness   Current Status: pt is still  feeling a little dizziness, and jittery/shaky     Went to [facility] and [facility], got home 6/5/25    Diarrhea in [facility] almost 2 weeks ago 5/28/25  Took imodium which helped with diarrhea some    Got lightheaded in [facility] 6/3/25  Started feeling really fatigued and generally weak    Got home 6/5 and went to ER shortly after arriving back home  Reported generalized weakness, fatigue, lightheadedness, nausea, diarrhea.   She initially complained of some paresthesias and tingling, most prominent on the right side of the face. MRI/MRA head neck negative. Laboratory workup overall reassuring. Considered viral diarrheal illness but she was unable to provide stool sample.    Seen again in ED yesterday (6/8) with ongoing symptoms  Meningitis/encephalitis considered but thought unlikely as she was afebrile, normal neuro exam, no meningeal signs. ED provider discussed doing LP but Nkau decided against this.  She had worsening leukocytosis compared to her previous visit but unclear what the significance of this is. Normal lactic acid and procalcitonin. No focalizing signs or symptoms to suggest other source of infection or sepsis. Headache improved with migraine cocktail.    Negative pregnancy test in ED 6/5 and 6/8    Blood cultures sent at ED visit yesterday - show no growth x 1 day today    Today she is still feeling jittery and nauseated  Diarrhea this morning x 2, non-bloody  Vomited x 2 this morning, non-bloody    No headache today  No neck pain, fever  Not as dizzy  Not as fatigued    No abdominal pain  Low appetite, but drinking fluids  Normal urination    Wegovy taken away during travel, has not taken in last 3 weeks  Stopped Wellbutrin about 1 month ago  Only taking hydroxyzine PRN    Amoxicillin x 4 days in [facility] for what appeared to be an infected insect bite. She was not seen for evaluation, just purchased amoxicillin from local pharmacy. She took for 4 days, stopped taking 6/4/25. Insect bites seem to be  "healing, do not look infected.        Objective    /87 (BP Location: Right arm, Patient Position: Sitting, Cuff Size: Adult Regular)   Pulse 79   Temp 98.1  F (36.7  C) (Oral)   Resp 20   Ht 1.549 m (5' 1\")   Wt 94.8 kg (208 lb 14.4 oz)   LMP 04/14/2025   SpO2 98%   BMI 39.47 kg/m    Body mass index is 39.47 kg/m .  Physical Exam   Constitutional:       General: She is not in acute distress.     Appearance: She is ill-appearing. She is not toxic-appearing.   HENT:      Head: Normocephalic and atraumatic.      Right Ear: External ear normal.      Left Ear: External ear normal.      Nose: Nose normal.      Mouth/Throat:      Mouth: Mucous membranes are moist.   Eyes:      Extraocular Movements: Extraocular movements intact.      Conjunctiva/sclera: Conjunctivae normal.      Pupils: Pupils are equal, round, and reactive to light.   Cardiovascular: regular rate and rhythm, normal S1 S2, no S3 or S4, no murmur, click or rub. No peripheral edema. No calf tenderness.  Pulmonary:      Effort: Pulmonary effort is normal. No respiratory distress.      Breath sounds: Normal breath sounds. No wheezing, rhonchi or rales.   Abdominal:      General: Abdomen is flat. Bowel sounds are normal. There is no distension.      Palpations: Abdomen is soft.      Tenderness: There is no abdominal tenderness. There is no guarding or rebound.   Musculoskeletal:         General: No deformity or signs of injury.      Cervical back: Full passive range of motion without pain, normal range of motion and neck supple. No rigidity.   Skin:     General: Skin is warm and dry. Several healing insect bites to bilateral lower extremities without surrounding erythema, swelling, warmth, drainage. No rash.   Neurological: alert, oriented x 3, CN II-XII grossly intact, no focal deficits. Mentation intact, speech normal. Normal gait.  Psychiatric:      Behavior: mentation appears normal, affect normal/bright             Signed Electronically by: " Mariama Nava PA-C

## 2025-06-10 PROCEDURE — 87507 IADNA-DNA/RNA PROBE TQ 12-25: CPT | Performed by: PHYSICIAN ASSISTANT

## 2025-06-11 ENCOUNTER — RESULTS FOLLOW-UP (OUTPATIENT)
Dept: FAMILY MEDICINE | Facility: CLINIC | Age: 30
End: 2025-06-11

## 2025-06-11 ENCOUNTER — OFFICE VISIT (OUTPATIENT)
Dept: FAMILY MEDICINE | Facility: CLINIC | Age: 30
End: 2025-06-11
Payer: COMMERCIAL

## 2025-06-11 ENCOUNTER — MYC MEDICAL ADVICE (OUTPATIENT)
Dept: FAMILY MEDICINE | Facility: CLINIC | Age: 30
End: 2025-06-11

## 2025-06-11 VITALS
WEIGHT: 203.6 LBS | HEART RATE: 80 BPM | BODY MASS INDEX: 38.44 KG/M2 | SYSTOLIC BLOOD PRESSURE: 116 MMHG | RESPIRATION RATE: 18 BRPM | HEIGHT: 61 IN | TEMPERATURE: 98 F | OXYGEN SATURATION: 98 % | DIASTOLIC BLOOD PRESSURE: 84 MMHG

## 2025-06-11 DIAGNOSIS — A49.8 ESCHERICHIA COLI (E. COLI) INFECTION: Primary | ICD-10-CM

## 2025-06-11 DIAGNOSIS — R11.0 NAUSEA: ICD-10-CM

## 2025-06-11 DIAGNOSIS — R53.1 WEAKNESS GENERALIZED: ICD-10-CM

## 2025-06-11 DIAGNOSIS — R20.2 PARESTHESIAS: ICD-10-CM

## 2025-06-11 DIAGNOSIS — R00.2 PALPITATIONS: ICD-10-CM

## 2025-06-11 LAB
ADV 40+41 DNA STL QL NAA+NON-PROBE: NEGATIVE
ALBUMIN SERPL BCG-MCNC: 4.6 G/DL (ref 3.5–5.2)
ALP SERPL-CCNC: 87 U/L (ref 40–150)
ALT SERPL W P-5'-P-CCNC: 29 U/L (ref 0–50)
ANION GAP SERPL CALCULATED.3IONS-SCNC: 15 MMOL/L (ref 7–15)
AST SERPL W P-5'-P-CCNC: 24 U/L (ref 0–45)
ASTRO TYP 1-8 RNA STL QL NAA+NON-PROBE: NEGATIVE
BASOPHILS # BLD AUTO: 0 10E3/UL (ref 0–0.2)
BASOPHILS NFR BLD AUTO: 0 %
BILIRUB SERPL-MCNC: 0.7 MG/DL
BUN SERPL-MCNC: 10.1 MG/DL (ref 6–20)
C CAYETANENSIS DNA STL QL NAA+NON-PROBE: NEGATIVE
CALCIUM SERPL-MCNC: 10.2 MG/DL (ref 8.8–10.4)
CAMPYLOBACTER DNA SPEC NAA+PROBE: NEGATIVE
CHLORIDE SERPL-SCNC: 101 MMOL/L (ref 98–107)
CREAT SERPL-MCNC: 0.9 MG/DL (ref 0.51–0.95)
CRYPTOSP DNA STL QL NAA+NON-PROBE: NEGATIVE
E COLI O157 DNA STL QL NAA+NON-PROBE: ABNORMAL
E HISTOLYT DNA STL QL NAA+NON-PROBE: NEGATIVE
EAEC ASTA GENE ISLT QL NAA+PROBE: POSITIVE
EC STX1+STX2 GENES STL QL NAA+NON-PROBE: NEGATIVE
EGFRCR SERPLBLD CKD-EPI 2021: 88 ML/MIN/1.73M2
EOSINOPHIL # BLD AUTO: 0.2 10E3/UL (ref 0–0.7)
EOSINOPHIL NFR BLD AUTO: 1 %
EPEC EAE GENE STL QL NAA+NON-PROBE: POSITIVE
ERYTHROCYTE [DISTWIDTH] IN BLOOD BY AUTOMATED COUNT: 12.9 % (ref 10–15)
ETEC LTA+ST1A+ST1B TOX ST NAA+NON-PROBE: POSITIVE
G LAMBLIA DNA STL QL NAA+NON-PROBE: NEGATIVE
GLUCOSE SERPL-MCNC: 84 MG/DL (ref 70–99)
HCG SERPL QL: NEGATIVE
HCO3 SERPL-SCNC: 20 MMOL/L (ref 22–29)
HCT VFR BLD AUTO: 46.1 % (ref 35–47)
HGB BLD-MCNC: 15.5 G/DL (ref 11.7–15.7)
IMM GRANULOCYTES # BLD: 0 10E3/UL
IMM GRANULOCYTES NFR BLD: 0 %
LYMPHOCYTES # BLD AUTO: 3.3 10E3/UL (ref 0.8–5.3)
LYMPHOCYTES NFR BLD AUTO: 23 %
MCH RBC QN AUTO: 28.2 PG (ref 26.5–33)
MCHC RBC AUTO-ENTMCNC: 33.6 G/DL (ref 31.5–36.5)
MCV RBC AUTO: 84 FL (ref 78–100)
MONOCYTES # BLD AUTO: 1 10E3/UL (ref 0–1.3)
MONOCYTES NFR BLD AUTO: 7 %
NEUTROPHILS # BLD AUTO: 10.1 10E3/UL (ref 1.6–8.3)
NEUTROPHILS NFR BLD AUTO: 69 %
NOROVIRUS GI+II RNA STL QL NAA+NON-PROBE: NEGATIVE
P SHIGELLOIDES DNA STL QL NAA+NON-PROBE: NEGATIVE
PLATELET # BLD AUTO: 315 10E3/UL (ref 150–450)
POTASSIUM SERPL-SCNC: 3.9 MMOL/L (ref 3.4–5.3)
PROT SERPL-MCNC: 7.8 G/DL (ref 6.4–8.3)
RBC # BLD AUTO: 5.49 10E6/UL (ref 3.8–5.2)
RVA RNA STL QL NAA+NON-PROBE: NEGATIVE
SALMONELLA SP RPOD STL QL NAA+PROBE: NEGATIVE
SAPO I+II+IV+V RNA STL QL NAA+NON-PROBE: NEGATIVE
SHIGELLA SP+EIEC IPAH ST NAA+NON-PROBE: NEGATIVE
SODIUM SERPL-SCNC: 136 MMOL/L (ref 135–145)
TSH SERPL DL<=0.005 MIU/L-ACNC: 6.31 UIU/ML (ref 0.3–4.2)
V CHOLERAE DNA SPEC QL NAA+PROBE: NEGATIVE
VIBRIO DNA SPEC NAA+PROBE: NEGATIVE
WBC # BLD AUTO: 14.7 10E3/UL (ref 4–11)
Y ENTEROCOL DNA STL QL NAA+PROBE: NEGATIVE

## 2025-06-11 PROCEDURE — 3079F DIAST BP 80-89 MM HG: CPT | Performed by: PHYSICIAN ASSISTANT

## 2025-06-11 PROCEDURE — 85025 COMPLETE CBC W/AUTO DIFF WBC: CPT | Performed by: PHYSICIAN ASSISTANT

## 2025-06-11 PROCEDURE — 36415 COLL VENOUS BLD VENIPUNCTURE: CPT | Performed by: PHYSICIAN ASSISTANT

## 2025-06-11 PROCEDURE — G2211 COMPLEX E/M VISIT ADD ON: HCPCS | Performed by: PHYSICIAN ASSISTANT

## 2025-06-11 PROCEDURE — 84443 ASSAY THYROID STIM HORMONE: CPT | Performed by: PHYSICIAN ASSISTANT

## 2025-06-11 PROCEDURE — 3074F SYST BP LT 130 MM HG: CPT | Performed by: PHYSICIAN ASSISTANT

## 2025-06-11 PROCEDURE — 84439 ASSAY OF FREE THYROXINE: CPT | Performed by: PHYSICIAN ASSISTANT

## 2025-06-11 PROCEDURE — 84703 CHORIONIC GONADOTROPIN ASSAY: CPT | Performed by: PHYSICIAN ASSISTANT

## 2025-06-11 PROCEDURE — 83735 ASSAY OF MAGNESIUM: CPT | Performed by: PHYSICIAN ASSISTANT

## 2025-06-11 PROCEDURE — 99215 OFFICE O/P EST HI 40 MIN: CPT | Performed by: PHYSICIAN ASSISTANT

## 2025-06-11 PROCEDURE — 93000 ELECTROCARDIOGRAM COMPLETE: CPT | Performed by: PHYSICIAN ASSISTANT

## 2025-06-11 PROCEDURE — 80053 COMPREHEN METABOLIC PANEL: CPT | Performed by: PHYSICIAN ASSISTANT

## 2025-06-11 PROCEDURE — 99417 PROLNG OP E/M EACH 15 MIN: CPT | Performed by: PHYSICIAN ASSISTANT

## 2025-06-11 RX ORDER — ONDANSETRON 4 MG/1
4-8 TABLET, ORALLY DISINTEGRATING ORAL EVERY 6 HOURS PRN
Qty: 10 TABLET | Refills: 0 | Status: SHIPPED | OUTPATIENT
Start: 2025-06-11

## 2025-06-11 NOTE — PROGRESS NOTES
Assessment & Plan       Escherichia coli (E. coli) infection  Nausea  Weakness generalized  Paresthesias  Stool bacteria/virus panel resulted after visit today - positive for enteropathogenic E. coli (EPEC), enteroaggregative E. coli (EAEC), and enterotoxigenic E. coli (ETEC). Symptoms likely related to this. Diarrhea has been improving though still having nausea/vomiting. She has slightly dry mucous membranes on exam today but otherwise no signs/symptoms of dehydration and she has been able to keep fluids down, urinating normally. WBC and neutrophils improved today compared to 3 days ago. Normal neuro exam, afebrile. She had normal head/neck MRI/MRA in ED on 6/5/25; suspect there is some anxiety component to paresthesias and she agrees with this though we will continue to monitor. Plan for continuing symptomatic management, fluids, diet as tolerated. We will follow-up in clinic again in 2 days for reassessment.  - CBC with platelets and differential; Future  - Comprehensive metabolic panel (BMP + Alb, Alk Phos, ALT, AST, Total. Bili, TP); Future  - TSH with free T4 reflex; Future  - Magnesium; Future  - HCG qualitative, Blood (TNY120); Future  - CBC with platelets and differential  - Comprehensive metabolic panel (BMP + Alb, Alk Phos, ALT, AST, Total. Bili, TP)  - TSH with free T4 reflex  - Magnesium  - HCG qualitative, Blood (APL994)  - ondansetron (ZOFRAN ODT) 4 MG ODT tab; Take 1-2 tablets (4-8 mg) by mouth every 6 hours as needed for nausea.  - T4 free    Palpitations  Mentioned a few palpitations over last few days. EKG normal today. No chest pain, shortness of breath, leg swelling, calf pain. She thinks palpitations are related to anxiety. Will continue to monitor.  - EKG 12-lead complete w/read - Clinics      Follow-up in 2 days in clinic to recheck (visit scheduled)  Discussed plan of care and reasons to present to the ED (emergency department). Patient in agreement with plan, questions answered.     83  "minutes spent on the date of the encounter doing chart review, history and exam, documentation and further activities per the note    Subjective   Leann is a 30 year old, presenting for the following health issues:  Follow Up        6/11/2025     1:51 PM   Additional Questions   Roomed by Marie GREENFIELD   Accompanied by self         6/11/2025     1:51 PM   Patient Reported Additional Medications   Patient reports taking the following new medications n/a     History of Present Illness       Reason for visit:  Follow up from appointment on 06/09    She eats 0-1 servings of fruits and vegetables daily.She consumes 0 sweetened beverage(s) daily.She exercises with enough effort to increase her heart rate 9 or less minutes per day.  She exercises with enough effort to increase her heart rate 3 or less days per week. She is missing 1 dose(s) of medications per week.  She is not taking prescribed medications regularly due to remembering to take.        Feeling mild improvement over last few days  Mild headache on and off, improves with tylenol/ibuprofen  Mild pressure on top of head  Not having severe headaches like she was previously having  No neck pain/stiffness  No fever  No confusion    Continues to have nausea and vomiting (non-bloody, non-bilious) after eating so has been eating less. Has only eaten a little rice and chicken soup over last few days and vomited each time after eating. Zofran has helped some but still not wanting to eat due to fear of vomiting.  No abdominal pain  No diarrhea for 2 days but stools still looser than normal. No blood in stool    Drinking water and keeping liquids down.  Normal urination    Feeling generally weak, fatigued  Not sleeping well  Feels \"shaky\" when she lays down to sleep at night - almost like shaking chills but does not feel cold and has not had a fever, sweating, etc.    She has lost 5 lbs in last 2 days    Has noticed a few palpitations   No chest pain, shortness of breath    No " "leg swelling, calf pain    History of intermittent numbness/tingling in both hands, mostly in mornings if she has been sleeping with arms/wrists bent. She reports tingling is in all fingers, improves with extending the arm/wrists. Has been having a little more hand tingling recently. Was having tingling in the face at ED visit on 6/5/25 - head MRI as well as head/neck MRA were normal. No other numbness/tingling. Hands were cramping when having blood pressure checked (when cuff was inflated). She admits to increased anxiety with illness and is afraid she is never going to feel normal again so thinks anxiety is playing a part in some of these symptoms.    Does not think she is pregnant but did try for pregnancy during recent trip to [facility] since she was not taking Wegovy  Had negative pregnancy test x2 at recent ED visits  Typically does not have regular periods, follows with OB/GYN for infertility  Patient's last menstrual period was 04/14/2025.     She feels her symptoms coincided with taking a vitamin supplement in [facility] (called [place] Pur) and is worried that maybe this caused vitamin toxicity. The information she has on this supplement shows that it includes the following:  Fat soluble vitamins: Vitamin A, E, D3  Water soluble vitamins: B1, B2, B3, B5, B6, B7, B9, B12  Proteins include collagen and 10 other amino acids  Black raspberry (bokbunja). There is a warning that there could be \"heat, redness, itching in the face, arms, toes - symptoms caused by the black raspberry working to detoxify the blood vessels, disappear within 15-30 minutes\"        Objective    /84   Pulse 80   Temp 98  F (36.7  C) (Oral)   Resp 18   Ht 1.549 m (5' 1\")   Wt 92.4 kg (203 lb 9.6 oz)   LMP 04/14/2025   SpO2 98%   BMI 38.47 kg/m    Body mass index is 38.47 kg/m .  Physical Exam   Constitutional:       General: She is not in acute distress.     Appearance: She is ill-appearing. She is not toxic-appearing.   HENT: " Normocephalic and atraumatic. Ear canals and TMs normal. Slightly dry mucous membranes. Mouth without ulcers or lesions. No posterior oropharyngeal erythema, edema, exudate. No cervical lymphadenopathy.  Eyes:      Extraocular Movements: Extraocular movements intact.      Conjunctiva/sclera: Conjunctivae normal.      Pupils: Pupils are equal, round, and reactive to light.   Cardiovascular: regular rate and rhythm, normal S1 S2, no S3 or S4, no murmur, click or rub. No peripheral edema. No calf tenderness.  Pulmonary:      Effort: Pulmonary effort is normal. No respiratory distress.      Breath sounds: Normal breath sounds. No wheezing, rhonchi or rales.   Abdominal:      General: Abdomen is flat. Bowel sounds are normal. There is no distension.      Palpations: Abdomen is soft.      Tenderness: There is no abdominal tenderness. There is no guarding or rebound.   Musculoskeletal:         General: No deformity or signs of injury.      Cervical back: normal range of motion and neck supple. No rigidity.   Skin:     General: Skin is warm and dry. Several healing insect bites to bilateral lower extremities without surrounding erythema, swelling, warmth, drainage. No rash.   Neurological: alert, oriented x 3, CN II-XII grossly intact, no focal deficits. Mentation intact, speech normal. Normal gait. No obvious tremor.   Psychiatric:      Behavior: mentation appears normal, affect normal/bright     Results for orders placed or performed in visit on 06/11/25   Comprehensive metabolic panel (BMP + Alb, Alk Phos, ALT, AST, Total. Bili, TP)     Status: Abnormal   Result Value Ref Range    Sodium 136 135 - 145 mmol/L    Potassium 3.9 3.4 - 5.3 mmol/L    Carbon Dioxide (CO2) 20 (L) 22 - 29 mmol/L    Anion Gap 15 7 - 15 mmol/L    Urea Nitrogen 10.1 6.0 - 20.0 mg/dL    Creatinine 0.90 0.51 - 0.95 mg/dL    GFR Estimate 88 >60 mL/min/1.73m2    Calcium 10.2 8.8 - 10.4 mg/dL    Chloride 101 98 - 107 mmol/L    Glucose 84 70 - 99 mg/dL     Alkaline Phosphatase 87 40 - 150 U/L    AST 24 0 - 45 U/L    ALT 29 0 - 50 U/L    Protein Total 7.8 6.4 - 8.3 g/dL    Albumin 4.6 3.5 - 5.2 g/dL    Bilirubin Total 0.7 <=1.2 mg/dL   TSH with free T4 reflex     Status: Abnormal   Result Value Ref Range    TSH 6.31 (H) 0.30 - 4.20 uIU/mL   Magnesium     Status: Normal   Result Value Ref Range    Magnesium 1.9 1.7 - 2.3 mg/dL   HCG qualitative, Blood ([id number])     Status: Normal   Result Value Ref Range    hCG Serum Qualitative Negative Negative   Extra Tube *Canceled*     Status: None ()    Narrative    The following orders were created for panel order Extra Tube.  Procedure                               Abnormality         Status                     ---------                               -----------         ------                     Extra Blue Top Tube[[id number]]                                                        Extra Red Top Tube[[id number]]                                                         Extra Green Top (Lithiu...[[id number]]                                                 Extra Purple Top Tube[[id number]]                                                        Please view results for these tests on the individual orders.   CBC with platelets and differential     Status: Abnormal   Result Value Ref Range    WBC Count 14.7 (H) 4.0 - 11.0 10e3/uL    RBC Count 5.49 (H) 3.80 - 5.20 10e6/uL    Hemoglobin 15.5 11.7 - 15.7 g/dL    Hematocrit 46.1 35.0 - 47.0 %    MCV 84 78 - 100 fL    MCH 28.2 26.5 - 33.0 pg    MCHC 33.6 31.5 - 36.5 g/dL    RDW 12.9 10.0 - 15.0 %    Platelet Count 315 150 - 450 10e3/uL    % Neutrophils 69 %    % Lymphocytes 23 %    % Monocytes 7 %    % Eosinophils 1 %    % Basophils 0 %    % Immature Granulocytes 0 %    Absolute Neutrophils 10.1 (H) 1.6 - 8.3 10e3/uL    Absolute Lymphocytes 3.3 0.8 - 5.3 10e3/uL    Absolute Monocytes 1.0 0.0 - 1.3 10e3/uL    Absolute Eosinophils 0.2 0.0 - 0.7 10e3/uL    Absolute Basophils 0.0 0.0 - 0.2  10e3/uL    Absolute Immature Granulocytes 0.0 <=0.4 10e3/uL    Narrative    Verified by repeat analysis DA     T4 free     Status: Normal   Result Value Ref Range    Free T4 1.35 0.90 - 1.70 ng/dL   CBC with platelets and differential     Status: Abnormal    Narrative    The following orders were created for panel order CBC with platelets and differential.  Procedure                               Abnormality         Status                     ---------                               -----------         ------                     CBC with platelets and ...[[id number]]  Abnormal            Final result                 Please view results for these tests on the individual orders.     EKG: Normal sinus rhythm, no ST/T wave changes, normal axis, normal EKG          Signed Electronically by: Mariama Nava PA-C

## 2025-06-11 NOTE — LETTER
2025    Alfonso Riddle   1995        To Whom it May Concern;    Please excuse Alfonso Riddle from work from 25 - 25 due to illness. She may return to work on 25 as long as symptoms have improved.     Sincerely,        Mariama Nava PA-C

## 2025-06-12 ENCOUNTER — RESULTS FOLLOW-UP (OUTPATIENT)
Dept: FAMILY MEDICINE | Facility: CLINIC | Age: 30
End: 2025-06-12
Payer: COMMERCIAL

## 2025-06-12 DIAGNOSIS — E03.8 SUBCLINICAL HYPOTHYROIDISM: Primary | ICD-10-CM

## 2025-06-12 LAB
BACTERIA SPEC CULT: NORMAL
BACTERIA SPEC CULT: NORMAL
MAGNESIUM SERPL-MCNC: 1.9 MG/DL (ref 1.7–2.3)
T4 FREE SERPL-MCNC: 1.35 NG/DL (ref 0.9–1.7)

## 2025-06-16 ENCOUNTER — VIRTUAL VISIT (OUTPATIENT)
Dept: FAMILY MEDICINE | Facility: CLINIC | Age: 30
End: 2025-06-16
Attending: PHYSICIAN ASSISTANT
Payer: COMMERCIAL

## 2025-06-16 DIAGNOSIS — E66.813 OBESITY, CLASS III, BMI 40-49.9 (MORBID OBESITY) (H): Primary | ICD-10-CM

## 2025-06-16 LAB
ADV 40+41 DNA STL QL NAA+NON-PROBE: NEGATIVE
ASTRO TYP 1-8 RNA STL QL NAA+NON-PROBE: NEGATIVE
C CAYETANENSIS DNA STL QL NAA+NON-PROBE: NEGATIVE
CAMPYLOBACTER DNA SPEC NAA+PROBE: NEGATIVE
CRYPTOSP DNA STL QL NAA+NON-PROBE: NEGATIVE
E COLI O157 DNA STL QL NAA+NON-PROBE: ABNORMAL
E HISTOLYT DNA STL QL NAA+NON-PROBE: NEGATIVE
EAEC ASTA GENE ISLT QL NAA+PROBE: POSITIVE
EC STX1+STX2 GENES STL QL NAA+NON-PROBE: NEGATIVE
EPEC EAE GENE STL QL NAA+NON-PROBE: POSITIVE
ETEC LTA+ST1A+ST1B TOX ST NAA+NON-PROBE: POSITIVE
G LAMBLIA DNA STL QL NAA+NON-PROBE: NEGATIVE
NOROVIRUS GI+II RNA STL QL NAA+NON-PROBE: NEGATIVE
P SHIGELLOIDES DNA STL QL NAA+NON-PROBE: NEGATIVE
RVA RNA STL QL NAA+NON-PROBE: NEGATIVE
SALMONELLA SP RPOD STL QL NAA+PROBE: NEGATIVE
SAPO I+II+IV+V RNA STL QL NAA+NON-PROBE: NEGATIVE
SHIGELLA SP+EIEC IPAH ST NAA+NON-PROBE: NEGATIVE
V CHOLERAE DNA SPEC QL NAA+PROBE: NEGATIVE
VIBRIO DNA SPEC NAA+PROBE: NEGATIVE
Y ENTEROCOL DNA STL QL NAA+PROBE: NEGATIVE

## 2025-06-16 PROCEDURE — 98005 SYNCH AUDIO-VIDEO EST LOW 20: CPT | Performed by: PHYSICIAN ASSISTANT

## 2025-06-16 NOTE — PROGRESS NOTES
Leann is a 30 year old who is being evaluated via a billable video visit.    How would you like to obtain your AVS? MyChart  If the video visit is dropped, the invitation should be resent by: Text to cell phone: [phone]  Will anyone else be joining your video visit? No      Assessment & Plan     Obesity, Class III, BMI 40-49.9 (morbid obesity) (H)  Has been off of wegovy for last 3 weeks but would like to restart. Recommend waiting until she has completely recovered from current GI illness before restarting, restart at 0.5 mg before going back up to 1 mg. She prefers taking her medication on Sundays so will wait until this coming Sunday (7/22) to start medication. We will plan to follow-up in 1 month for med check and make sure she is tolerating medication well before increasing dose.  - Semaglutide-Weight Management (WEGOVY) 0.5 MG/0.5ML pen; Inject 0.5 mg subcutaneously once a week for 4 doses. THEN increase to 1 mg once weekly    The longitudinal plan of care for the diagnosis(es)/condition(s) as documented were addressed during this visit. Due to the added complexity in care, I will continue to support Leann in the subsequent management and with ongoing continuity of care.        Subjective   Leann is a 30 year old, presenting for the following health issues:  Follow Up (Zepbound)        6/16/2025     7:11 AM   Additional Questions   Roomed by Jennifer STEWART       History of Present Illness       Reason for visit:  Follow up from appointment on 06/09    She eats 0-1 servings of fruits and vegetables daily.She consumes 0 sweetened beverage(s) daily.She exercises with enough effort to increase her heart rate 9 or less minutes per day.  She exercises with enough effort to increase her heart rate 3 or less days per week. She is missing 1 dose(s) of medications per week.  She is not taking prescribed medications regularly due to remembering to take.        Continuing to feel better over the weekend. Still some mild  nausea but improving and did not need zofran yesterday. No vomiting. Appetite improving. Stools still slightly looser than normal but no diarrhea. Overall feeling much better than last week.     Would like to restart wegovy  She tolerated wegovy previously but has been off medication for last 3 weeks as medication was taken from her during her travel to [place]  Stopped zepbound previously due to injection site reactions but had been tolerating wegovy        Objective           Vitals:  No vitals were obtained today due to virtual visit.    Physical Exam   GENERAL: alert and no distress  EYES: Eyes grossly normal to inspection.  RESP: No audible wheeze, cough, or visible cyanosis.    SKIN: Visible skin clear.  NEURO: Cranial nerves grossly intact.  Mentation and speech appropriate for age.  PSYCH: Appropriate affect, tone, and pace of words        Video-Visit Details    Type of service:  Video Visit   Originating Location (pt. Location): Home    Distant Location (provider location):  On-site  Platform used for Video Visit: Alberto  Signed Electronically by: Mariama Nava PA-C

## 2025-06-16 NOTE — LETTER
2025    Alfonso Riddle   1995        To Whom it May Concern;    Alfonso Riddle may return to work today, 25, without restrictions.    Sincerely,        Mariama Nava PA-C

## 2025-07-15 ENCOUNTER — VIRTUAL VISIT (OUTPATIENT)
Dept: FAMILY MEDICINE | Facility: CLINIC | Age: 30
End: 2025-07-15
Payer: COMMERCIAL

## 2025-07-15 DIAGNOSIS — F41.1 GENERALIZED ANXIETY DISORDER: ICD-10-CM

## 2025-07-15 DIAGNOSIS — E66.813 OBESITY, CLASS III, BMI 40-49.9 (MORBID OBESITY) (H): ICD-10-CM

## 2025-07-15 PROCEDURE — 98006 SYNCH AUDIO-VIDEO EST MOD 30: CPT | Performed by: PHYSICIAN ASSISTANT

## 2025-07-15 RX ORDER — SERTRALINE HYDROCHLORIDE 100 MG/1
100 TABLET, FILM COATED ORAL DAILY
Qty: 30 TABLET | Refills: 1 | Status: SHIPPED | OUTPATIENT
Start: 2025-07-15

## 2025-07-15 RX ORDER — SEMAGLUTIDE 0.5 MG/.5ML
INJECTION, SOLUTION SUBCUTANEOUS
COMMUNITY
Start: 2025-06-18

## 2025-07-15 ASSESSMENT — ANXIETY QUESTIONNAIRES
1. FEELING NERVOUS, ANXIOUS, OR ON EDGE: NEARLY EVERY DAY
7. FEELING AFRAID AS IF SOMETHING AWFUL MIGHT HAPPEN: NEARLY EVERY DAY
GAD7 TOTAL SCORE: 19
IF YOU CHECKED OFF ANY PROBLEMS ON THIS QUESTIONNAIRE, HOW DIFFICULT HAVE THESE PROBLEMS MADE IT FOR YOU TO DO YOUR WORK, TAKE CARE OF THINGS AT HOME, OR GET ALONG WITH OTHER PEOPLE: SOMEWHAT DIFFICULT
GAD7 TOTAL SCORE: 19
3. WORRYING TOO MUCH ABOUT DIFFERENT THINGS: NEARLY EVERY DAY
5. BEING SO RESTLESS THAT IT IS HARD TO SIT STILL: NEARLY EVERY DAY
6. BECOMING EASILY ANNOYED OR IRRITABLE: SEVERAL DAYS
2. NOT BEING ABLE TO STOP OR CONTROL WORRYING: NEARLY EVERY DAY

## 2025-07-15 ASSESSMENT — PATIENT HEALTH QUESTIONNAIRE - PHQ9
5. POOR APPETITE OR OVEREATING: NEARLY EVERY DAY
SUM OF ALL RESPONSES TO PHQ QUESTIONS 1-9: 6

## 2025-07-15 NOTE — PROGRESS NOTES
Leann is a 30 year old who is being evaluated via a billable video visit.    How would you like to obtain your AVS? MyChart  If the video visit is dropped, the invitation should be resent by: Text to cell phone: [phone]  Will anyone else be joining your video visit? No      Assessment & Plan     Generalized anxiety disorder  Anxiety is uncontrolled, impacting sleep/work. Feeling overwhelmed with work, frequent travel for work. Had miscarriage 12/2024 and doesn't really feel like she took time to allow herself to grieve/process the loss. She would have been due in the next month so thinks she is having more feelings about this. She is considering taking a leave from work to focus on mental health. I would support her in doing so if needed but she is hoping she won't need to take a leave. I do think she would really benefit from therapy but she declines referral today. She would like to try increasing sertraline dose and following up in 1 month to see how she's doing; she may consider therapy in the future.  - sertraline (ZOLOFT) 100 MG tablet; Take 1 tablet (100 mg) by mouth daily.    Obesity, Class III, BMI 40-49.9 (morbid obesity) (H)  Restarted Wegovy 0.5 mg on 7/6/25; took 2nd dose 2 days ago. She will complete 2 additional weeks and then increase to 1 mg dose. We will follow-up in 1 month at anxiety follow-up.  - Semaglutide-Weight Management (WEGOVY) 1 MG/0.5ML pen; Inject 1 mg subcutaneously once a week. Start taking AFTER taking 0.5 mg weekly for 4 weeks    Follow-up    Follow-up Visit   Expected date:  Aug 15, 2025 (Approximate)      Follow Up Appointment Details:     Follow-up with whom?: Me    Follow-Up for what?: Chronic Disease f/u    Chronic Disease f/u: Anxiety    How?: Virtual               The longitudinal plan of care for the diagnosis(es)/condition(s) as documented were addressed during this visit. Due to the added complexity in care, I will continue to support Leann in the subsequent management  and with ongoing continuity of care.    Brent Noriega is a 30 year old, presenting for the following health issues:  Recheck Medication        7/15/2025     8:52 AM   Additional Questions   Roomed by fantasma aguilar   Accompanied by self         7/15/2025     8:52 AM   Patient Reported Additional Medications   Patient reports taking the following new medications na     HPI      Still has on and off diarrhea/loose stools after illness, E. coli last month  No blood in stool    She will need paperwork completed for work as she was out of work 6/5-6/13 for illness. She returned to work 6/16.      Medication Followup of Wegovy  Taking Medication as prescribed: yes  Side Effects:  None  Medication Helping Symptoms:  Just feels like its taking longer to lose weight this time     Restarted Wegovy 0.5 mg on 7/6/25  Took 2nd dose 2 days ago  Will complete 2 additional weeks and then increase to 1 mg dose  No obvious side effects    Anxiety:  Restarted sertraline about 1 month ago  She feels it has been somewhat helpful but anxiety is still really high  Denies medication side effects  She increased sertraline dose from 50 mg to 75 mg 2 days ago on her own    Anxiety is interfering with work, sleep  She had to leave a meeting last week due to panic attack  She uses hydroxyzine 25 mg PRN but it makes her too drowsy to take during the day so only takes it at night if needed to help with sleep/anxiety. It does make her a little groggier the next day so she tries not to take it often.    Feeling overwhelmed with work, frequent travel for work. Had miscarriage 12/2024 and doesn't really feel like she took time to allow herself to grieve/process the loss. She would have been due in the next month so thinks she is having more feelings about this.     Not open to therapy at this time        9/23/2024     7:34 AM 1/3/2025     1:49 PM 7/15/2025     9:13 AM   PHQ   PHQ-9 Total Score 6 2  6   Q9: Thoughts of better off dead/self-harm past 2  weeks Not at all Not at all Not at all       Patient-reported         11/29/2024     8:30 AM 1/3/2025     1:52 PM 7/15/2025     9:13 AM   MERCEDES-7 SCORE   Total Score 4 (minimal anxiety) 2 (minimal anxiety)    Total Score 4  2  19       Patient-reported           Objective           Vitals:  No vitals were obtained today due to virtual visit.    Physical Exam   GENERAL: alert and no distress  EYES: Eyes grossly normal to inspection.   RESP: No audible wheeze, cough, or visible cyanosis.    SKIN: Visible skin clear.  NEURO: Cranial nerves grossly intact.  Mentation and speech appropriate for age.  PSYCH: Appropriate affect, tone, and pace of words        Video-Visit Details    Type of service:  Video Visit   Originating Location (pt. Location): Home    Distant Location (provider location):  On-site  Platform used for Video Visit: Alberto  Signed Electronically by: Mariama Nava PA-C

## 2025-07-22 ENCOUNTER — MYC MEDICAL ADVICE (OUTPATIENT)
Dept: FAMILY MEDICINE | Facility: CLINIC | Age: 30
End: 2025-07-22
Payer: COMMERCIAL

## 2025-07-22 NOTE — TELEPHONE ENCOUNTER
Forms/Letter Request    Type of form/letter: FMLA - Unknown    Is Release of Information needed?: Yes  Was an ENRRIQUE obtained?  No- will need to MyChart completed forms back to patient    Do we have the form/letter: Yes: in providers basket    Who is the form from? Insurance comp    Where did/will the form come from? form was sent via [email or web address]    When is form/letter needed by: asap    How would you like the form/letter returned: [email or web address]    Patient Notified form requests are processed in 5-7 business days:Yes    Could we send this information to you in [email or web address] or would you prefer to receive a phone call?:   Patient would like to be contacted via [email or web address]          Jennifer RODRIGUEZ, - [facility]  Primary Care- [facility]

## 2025-07-30 ENCOUNTER — MYC MEDICAL ADVICE (OUTPATIENT)
Dept: FAMILY MEDICINE | Facility: CLINIC | Age: 30
End: 2025-07-30
Payer: COMMERCIAL

## 2025-07-30 NOTE — TELEPHONE ENCOUNTER
Form was completed and sent to patient via [employer].    Please see encounter from 7-22-25.    Stefani Choudhary

## 2025-07-30 NOTE — TELEPHONE ENCOUNTER
Form completed by Provider and sent to patient via [employer] as stated in below request.    Stefani Choudhary

## 2025-08-17 ENCOUNTER — MYC MEDICAL ADVICE (OUTPATIENT)
Dept: OBGYN | Facility: CLINIC | Age: 30
End: 2025-08-17
Payer: COMMERCIAL

## 2025-08-17 ENCOUNTER — MYC REFILL (OUTPATIENT)
Dept: FAMILY MEDICINE | Facility: CLINIC | Age: 30
End: 2025-08-17
Payer: COMMERCIAL

## 2025-08-17 DIAGNOSIS — T80.90XA INJECTION SITE REACTION, INITIAL ENCOUNTER: ICD-10-CM

## 2025-08-17 DIAGNOSIS — N91.5 OLIGOMENORRHEA, UNSPECIFIED TYPE: Primary | ICD-10-CM

## 2025-08-17 DIAGNOSIS — F41.1 GENERALIZED ANXIETY DISORDER: ICD-10-CM

## 2025-08-17 DIAGNOSIS — N97.8 FEMALE INFERTILITY ASSOCIATED WITH MALE FACTORS: ICD-10-CM

## 2025-08-17 DIAGNOSIS — Z31.81 FEMALE INFERTILITY ASSOCIATED WITH MALE FACTORS: ICD-10-CM

## 2025-08-17 RX ORDER — TRIAMCINOLONE ACETONIDE 1 MG/G
CREAM TOPICAL 3 TIMES DAILY
Qty: 15 G | Refills: 0 | Status: CANCELLED | OUTPATIENT
Start: 2025-08-17

## 2025-08-18 RX ORDER — TRIAMCINOLONE ACETONIDE 1 MG/G
CREAM TOPICAL
Qty: 15 G | Refills: 0 | OUTPATIENT
Start: 2025-08-18

## 2025-08-18 RX ORDER — HYDROXYZINE HYDROCHLORIDE 25 MG/1
25 TABLET, FILM COATED ORAL 3 TIMES DAILY PRN
Qty: 30 TABLET | Refills: 2 | Status: SHIPPED | OUTPATIENT
Start: 2025-08-18

## 2025-08-27 RX ORDER — MEDROXYPROGESTERONE ACETATE 10 MG
10 TABLET ORAL DAILY
Qty: 10 TABLET | Refills: 3 | Status: SHIPPED | OUTPATIENT
Start: 2025-08-27

## 2025-09-02 ENCOUNTER — VIRTUAL VISIT (OUTPATIENT)
Dept: FAMILY MEDICINE | Facility: CLINIC | Age: 30
End: 2025-09-02
Attending: PHYSICIAN ASSISTANT
Payer: COMMERCIAL

## 2025-09-02 ENCOUNTER — MYC MEDICAL ADVICE (OUTPATIENT)
Dept: FAMILY MEDICINE | Facility: CLINIC | Age: 30
End: 2025-09-02

## 2025-09-02 DIAGNOSIS — F41.1 GENERALIZED ANXIETY DISORDER: ICD-10-CM

## 2025-09-02 DIAGNOSIS — E66.813 OBESITY, CLASS III, BMI 40-49.9 (MORBID OBESITY) (H): ICD-10-CM

## 2025-09-02 PROCEDURE — 98006 SYNCH AUDIO-VIDEO EST MOD 30: CPT | Performed by: PHYSICIAN ASSISTANT

## 2025-09-02 ASSESSMENT — ANXIETY QUESTIONNAIRES
GAD7 TOTAL SCORE: 6
1. FEELING NERVOUS, ANXIOUS, OR ON EDGE: SEVERAL DAYS
3. WORRYING TOO MUCH ABOUT DIFFERENT THINGS: SEVERAL DAYS
2. NOT BEING ABLE TO STOP OR CONTROL WORRYING: SEVERAL DAYS
8. IF YOU CHECKED OFF ANY PROBLEMS, HOW DIFFICULT HAVE THESE MADE IT FOR YOU TO DO YOUR WORK, TAKE CARE OF THINGS AT HOME, OR GET ALONG WITH OTHER PEOPLE?: VERY DIFFICULT
5. BEING SO RESTLESS THAT IT IS HARD TO SIT STILL: SEVERAL DAYS
7. FEELING AFRAID AS IF SOMETHING AWFUL MIGHT HAPPEN: NOT AT ALL
7. FEELING AFRAID AS IF SOMETHING AWFUL MIGHT HAPPEN: NOT AT ALL
GAD7 TOTAL SCORE: 6
GAD7 TOTAL SCORE: 6
4. TROUBLE RELAXING: SEVERAL DAYS
IF YOU CHECKED OFF ANY PROBLEMS ON THIS QUESTIONNAIRE, HOW DIFFICULT HAVE THESE PROBLEMS MADE IT FOR YOU TO DO YOUR WORK, TAKE CARE OF THINGS AT HOME, OR GET ALONG WITH OTHER PEOPLE: VERY DIFFICULT
6. BECOMING EASILY ANNOYED OR IRRITABLE: SEVERAL DAYS